# Patient Record
Sex: FEMALE | Race: WHITE | NOT HISPANIC OR LATINO | Employment: UNEMPLOYED | RURAL
[De-identification: names, ages, dates, MRNs, and addresses within clinical notes are randomized per-mention and may not be internally consistent; named-entity substitution may affect disease eponyms.]

---

## 2021-08-16 ENCOUNTER — HOSPITAL ENCOUNTER (EMERGENCY)
Facility: HOSPITAL | Age: 61
Discharge: HOME | End: 2021-08-16
Payer: MEDICAID

## 2021-08-16 ENCOUNTER — INFUSION (OUTPATIENT)
Dept: INFECTIOUS DISEASES | Facility: HOSPITAL | Age: 61
End: 2021-08-16
Attending: EMERGENCY MEDICINE
Payer: MEDICAID

## 2021-08-16 VITALS
TEMPERATURE: 101 F | DIASTOLIC BLOOD PRESSURE: 95 MMHG | RESPIRATION RATE: 21 BRPM | HEART RATE: 82 BPM | HEIGHT: 66 IN | SYSTOLIC BLOOD PRESSURE: 164 MMHG | WEIGHT: 214 LBS | OXYGEN SATURATION: 97 % | BODY MASS INDEX: 34.39 KG/M2

## 2021-08-16 VITALS
DIASTOLIC BLOOD PRESSURE: 77 MMHG | OXYGEN SATURATION: 97 % | SYSTOLIC BLOOD PRESSURE: 170 MMHG | RESPIRATION RATE: 20 BRPM | HEART RATE: 95 BPM

## 2021-08-16 DIAGNOSIS — R05.9 COUGH: ICD-10-CM

## 2021-08-16 DIAGNOSIS — U07.1 COVID-19: Primary | ICD-10-CM

## 2021-08-16 DIAGNOSIS — B34.2 CORONAVIRUS INFECTION: Primary | ICD-10-CM

## 2021-08-16 LAB
ANION GAP SERPL CALCULATED.3IONS-SCNC: 11 MMOL/L (ref 7–16)
BACTERIA #/AREA URNS HPF: ABNORMAL /HPF
BASOPHILS # BLD AUTO: 0.01 K/UL (ref 0–0.2)
BASOPHILS NFR BLD AUTO: 0.1 % (ref 0–1)
BILIRUB UR QL STRIP: NEGATIVE
BUN SERPL-MCNC: 22 MG/DL (ref 7–18)
BUN/CREAT SERPL: 21 (ref 6–20)
CALCIUM SERPL-MCNC: 8.5 MG/DL (ref 8.5–10.1)
CHLORIDE SERPL-SCNC: 98 MMOL/L (ref 98–107)
CLARITY UR: CLEAR
CO2 SERPL-SCNC: 30 MMOL/L (ref 21–32)
COLOR UR: YELLOW
CREAT SERPL-MCNC: 1.07 MG/DL (ref 0.55–1.02)
DIFFERENTIAL METHOD BLD: ABNORMAL
EOSINOPHIL # BLD AUTO: 0 K/UL (ref 0–0.5)
EOSINOPHIL NFR BLD AUTO: 0 % (ref 1–4)
ERYTHROCYTE [DISTWIDTH] IN BLOOD BY AUTOMATED COUNT: 16.6 % (ref 11.5–14.5)
FLUAV AG UPPER RESP QL IA.RAPID: NEGATIVE
FLUBV AG UPPER RESP QL IA.RAPID: NEGATIVE
GLUCOSE SERPL-MCNC: 120 MG/DL (ref 74–106)
GLUCOSE UR STRIP-MCNC: NEGATIVE MG/DL
HCT VFR BLD AUTO: 42.3 % (ref 38–47)
HGB BLD-MCNC: 13.7 G/DL (ref 12–16)
IMM GRANULOCYTES # BLD AUTO: 0.04 K/UL (ref 0–0.04)
IMM GRANULOCYTES NFR BLD: 0.6 % (ref 0–0.4)
KETONES UR STRIP-SCNC: NEGATIVE MG/DL
LEUKOCYTE ESTERASE UR QL STRIP: NEGATIVE
LYMPHOCYTES # BLD AUTO: 1.07 K/UL (ref 1–4.8)
LYMPHOCYTES NFR BLD AUTO: 15.1 % (ref 27–41)
MCH RBC QN AUTO: 26 PG (ref 27–31)
MCHC RBC AUTO-ENTMCNC: 32.4 G/DL (ref 32–36)
MCV RBC AUTO: 80.4 FL (ref 80–96)
MONOCYTES # BLD AUTO: 0.39 K/UL (ref 0–0.8)
MONOCYTES NFR BLD AUTO: 5.5 % (ref 2–6)
MPC BLD CALC-MCNC: 10.1 FL (ref 9.4–12.4)
MUCOUS THREADS #/AREA URNS HPF: ABNORMAL /HPF
NEUTROPHILS # BLD AUTO: 5.59 K/UL (ref 1.8–7.7)
NEUTROPHILS NFR BLD AUTO: 78.7 % (ref 53–65)
NITRITE UR QL STRIP: NEGATIVE
NRBC # BLD AUTO: 0 X10E3/UL
NRBC, AUTO (.00): 0 %
PH UR STRIP: 6 PH UNITS
PLATELET # BLD AUTO: 209 K/UL (ref 150–400)
POTASSIUM SERPL-SCNC: 4.9 MMOL/L (ref 3.5–5.1)
PROT UR QL STRIP: 30
RBC # BLD AUTO: 5.26 M/UL (ref 4.2–5.4)
RBC # UR STRIP: ABNORMAL /UL
RBC #/AREA URNS HPF: ABNORMAL /HPF
SARS-COV+SARS-COV-2 AG RESP QL IA.RAPID: POSITIVE
SODIUM SERPL-SCNC: 134 MMOL/L (ref 136–145)
SP GR UR STRIP: >=1.03
SQUAMOUS #/AREA URNS LPF: ABNORMAL /LPF
TRICHOMONAS #/AREA URNS HPF: ABNORMAL /HPF
UROBILINOGEN UR STRIP-ACNC: 0.2 MG/DL
WBC # BLD AUTO: 7.1 K/UL (ref 4.5–11)
WBC #/AREA URNS HPF: ABNORMAL /HPF
YEAST #/AREA URNS HPF: ABNORMAL /HPF

## 2021-08-16 PROCEDURE — 99284 EMERGENCY DEPT VISIT MOD MDM: CPT | Mod: 25

## 2021-08-16 PROCEDURE — 25000242 PHARM REV CODE 250 ALT 637 W/ HCPCS: Performed by: NURSE PRACTITIONER

## 2021-08-16 PROCEDURE — 99284 EMERGENCY DEPT VISIT MOD MDM: CPT | Mod: ,,, | Performed by: NURSE PRACTITIONER

## 2021-08-16 PROCEDURE — 85025 COMPLETE CBC W/AUTO DIFF WBC: CPT | Performed by: NURSE PRACTITIONER

## 2021-08-16 PROCEDURE — 81001 URINALYSIS AUTO W/SCOPE: CPT | Performed by: NURSE PRACTITIONER

## 2021-08-16 PROCEDURE — 99284 PR EMERGENCY DEPT VISIT,LEVEL IV: ICD-10-PCS | Mod: ,,, | Performed by: NURSE PRACTITIONER

## 2021-08-16 PROCEDURE — 36415 COLL VENOUS BLD VENIPUNCTURE: CPT | Performed by: NURSE PRACTITIONER

## 2021-08-16 PROCEDURE — 87428 SARSCOV & INF VIR A&B AG IA: CPT | Performed by: NURSE PRACTITIONER

## 2021-08-16 PROCEDURE — 81003 URINALYSIS AUTO W/O SCOPE: CPT | Performed by: NURSE PRACTITIONER

## 2021-08-16 PROCEDURE — 25000003 PHARM REV CODE 250: Performed by: NURSE PRACTITIONER

## 2021-08-16 PROCEDURE — 87040 BLOOD CULTURE FOR BACTERIA: CPT | Performed by: NURSE PRACTITIONER

## 2021-08-16 PROCEDURE — M0243 CASIRIVI AND IMDEVI INFUSION: HCPCS | Performed by: NURSE PRACTITIONER

## 2021-08-16 PROCEDURE — 63600175 PHARM REV CODE 636 W HCPCS: Performed by: NURSE PRACTITIONER

## 2021-08-16 PROCEDURE — 80048 BASIC METABOLIC PNL TOTAL CA: CPT | Performed by: NURSE PRACTITIONER

## 2021-08-16 RX ORDER — ONDANSETRON 4 MG/1
4 TABLET, FILM COATED ORAL EVERY 6 HOURS
Qty: 12 TABLET | Refills: 0 | Status: SHIPPED | OUTPATIENT
Start: 2021-08-16

## 2021-08-16 RX ORDER — ALBUTEROL SULFATE 90 UG/1
4 AEROSOL, METERED RESPIRATORY (INHALATION)
Status: COMPLETED | OUTPATIENT
Start: 2021-08-16 | End: 2021-08-16

## 2021-08-16 RX ORDER — ONDANSETRON 4 MG/1
4 TABLET, ORALLY DISINTEGRATING ORAL ONCE AS NEEDED
Status: ACTIVE | OUTPATIENT
Start: 2021-08-16 | End: 2033-01-12

## 2021-08-16 RX ORDER — EPINEPHRINE 0.3 MG/.3ML
0.3 INJECTION SUBCUTANEOUS
Status: ACTIVE | OUTPATIENT
Start: 2021-08-16

## 2021-08-16 RX ORDER — ACETAMINOPHEN 325 MG/1
650 TABLET ORAL ONCE AS NEEDED
Status: ACTIVE | OUTPATIENT
Start: 2021-08-16 | End: 2033-01-12

## 2021-08-16 RX ORDER — VIT C/E/ZN/COPPR/LUTEIN/ZEAXAN 250MG-90MG
2000 CAPSULE ORAL DAILY
Qty: 28 CAPSULE | Refills: 0 | Status: SHIPPED | OUTPATIENT
Start: 2021-08-16 | End: 2021-08-30

## 2021-08-16 RX ORDER — DIPHENHYDRAMINE HYDROCHLORIDE 50 MG/ML
25 INJECTION INTRAMUSCULAR; INTRAVENOUS ONCE AS NEEDED
Status: ACTIVE | OUTPATIENT
Start: 2021-08-16 | End: 2033-01-12

## 2021-08-16 RX ORDER — FAMOTIDINE 20 MG/1
20 TABLET, FILM COATED ORAL 2 TIMES DAILY
Qty: 28 TABLET | Refills: 0 | Status: SHIPPED | OUTPATIENT
Start: 2021-08-16 | End: 2021-08-30

## 2021-08-16 RX ORDER — ALBUTEROL SULFATE 90 UG/1
2 AEROSOL, METERED RESPIRATORY (INHALATION) EVERY 4 HOURS PRN
Qty: 8 G | Refills: 0 | Status: SHIPPED | OUTPATIENT
Start: 2021-08-16

## 2021-08-16 RX ORDER — ALBUTEROL SULFATE 90 UG/1
2 AEROSOL, METERED RESPIRATORY (INHALATION)
Status: ACTIVE | OUTPATIENT
Start: 2021-08-16

## 2021-08-16 RX ORDER — SODIUM CHLORIDE 9 MG/ML
INJECTION, SOLUTION INTRAVENOUS
Status: DISCONTINUED
Start: 2021-08-16 | End: 2021-08-16 | Stop reason: HOSPADM

## 2021-08-16 RX ADMIN — IBUPROFEN 600 MG: 400 TABLET ORAL at 09:08

## 2021-08-16 RX ADMIN — ALBUTEROL SULFATE 4 PUFF: 90 AEROSOL, METERED RESPIRATORY (INHALATION) at 09:08

## 2021-08-16 RX ADMIN — CASIRIVIMAB AND IMDEVIMAB 600 MG: 600; 600 INJECTION, SOLUTION, CONCENTRATE INTRAVENOUS at 12:08

## 2021-08-22 LAB
BACTERIA BLD CULT: NORMAL
BACTERIA BLD CULT: NORMAL

## 2022-03-28 ENCOUNTER — OUTSIDE PLACE OF SERVICE (OUTPATIENT)
Dept: CARDIOLOGY | Facility: CLINIC | Age: 62
End: 2022-03-28
Payer: MEDICAID

## 2022-03-28 PROCEDURE — 93010 ELECTROCARDIOGRAM REPORT: CPT | Mod: ,,, | Performed by: INTERNAL MEDICINE

## 2022-03-28 PROCEDURE — 93010 PR ELECTROCARDIOGRAM REPORT: ICD-10-PCS | Mod: ,,, | Performed by: INTERNAL MEDICINE

## 2022-03-29 ENCOUNTER — OUTSIDE PLACE OF SERVICE (OUTPATIENT)
Dept: CARDIOLOGY | Facility: CLINIC | Age: 62
End: 2022-03-29
Payer: MEDICAID

## 2022-03-29 PROCEDURE — 93306 TTE W/DOPPLER COMPLETE: CPT | Mod: 26,,, | Performed by: INTERNAL MEDICINE

## 2022-03-29 PROCEDURE — 93306 PR ECHO HEART XTHORACIC,COMPLETE W DOPPLER: ICD-10-PCS | Mod: 26,,, | Performed by: INTERNAL MEDICINE

## 2024-09-06 ENCOUNTER — HOSPITAL ENCOUNTER (EMERGENCY)
Facility: HOSPITAL | Age: 64
Discharge: HOME | End: 2024-09-06
Payer: MEDICAID

## 2024-09-06 VITALS
TEMPERATURE: 98 F | OXYGEN SATURATION: 97 % | HEIGHT: 66 IN | DIASTOLIC BLOOD PRESSURE: 93 MMHG | HEART RATE: 73 BPM | BODY MASS INDEX: 31.98 KG/M2 | RESPIRATION RATE: 18 BRPM | WEIGHT: 199 LBS | SYSTOLIC BLOOD PRESSURE: 159 MMHG

## 2024-09-06 DIAGNOSIS — R10.84 GENERALIZED ABDOMINAL PAIN: Primary | ICD-10-CM

## 2024-09-06 LAB
ALBUMIN SERPL BCP-MCNC: 3.2 G/DL (ref 3.5–5)
ALBUMIN/GLOB SERPL: 0.9 {RATIO}
ALP SERPL-CCNC: 138 U/L (ref 55–142)
ALT SERPL W P-5'-P-CCNC: 22 U/L (ref 13–56)
AMPHET UR QL SCN: NEGATIVE
ANION GAP SERPL CALCULATED.3IONS-SCNC: 8 MMOL/L (ref 7–16)
AST SERPL W P-5'-P-CCNC: 18 U/L (ref 15–37)
BACTERIA #/AREA URNS HPF: ABNORMAL /HPF
BARBITURATES UR QL SCN: NEGATIVE
BASOPHILS # BLD AUTO: 0.06 K/UL (ref 0–0.2)
BASOPHILS NFR BLD AUTO: 0.7 % (ref 0–1)
BENZODIAZ METAB UR QL SCN: NEGATIVE
BILIRUB SERPL-MCNC: 0.2 MG/DL (ref ?–1.2)
BILIRUB UR QL STRIP: NEGATIVE
BUN SERPL-MCNC: 15 MG/DL (ref 7–18)
BUN/CREAT SERPL: 18 (ref 6–20)
CALCIUM SERPL-MCNC: 8.7 MG/DL (ref 8.5–10.1)
CANNABINOIDS UR QL SCN: NEGATIVE
CHLORIDE SERPL-SCNC: 104 MMOL/L (ref 98–107)
CLARITY UR: CLEAR
CO2 SERPL-SCNC: 27 MMOL/L (ref 21–32)
COCAINE UR QL SCN: NEGATIVE
COLOR UR: COLORLESS
CREAT SERPL-MCNC: 0.85 MG/DL (ref 0.55–1.02)
DIFFERENTIAL METHOD BLD: ABNORMAL
EGFR (NO RACE VARIABLE) (RUSH/TITUS): 76 ML/MIN/1.73M2
EOSINOPHIL # BLD AUTO: 0.8 K/UL (ref 0–0.5)
EOSINOPHIL NFR BLD AUTO: 9.8 % (ref 1–4)
ERYTHROCYTE [DISTWIDTH] IN BLOOD BY AUTOMATED COUNT: 17 % (ref 11.5–14.5)
GLOBULIN SER-MCNC: 3.7 G/DL (ref 2–4)
GLUCOSE SERPL-MCNC: 107 MG/DL (ref 74–106)
GLUCOSE UR STRIP-MCNC: NORMAL MG/DL
HCT VFR BLD AUTO: 33.3 % (ref 38–47)
HGB BLD-MCNC: 10.2 G/DL (ref 12–16)
IMM GRANULOCYTES # BLD AUTO: 0.02 K/UL (ref 0–0.04)
IMM GRANULOCYTES NFR BLD: 0.2 % (ref 0–0.4)
KETONES UR STRIP-SCNC: NEGATIVE MG/DL
LEUKOCYTE ESTERASE UR QL STRIP: ABNORMAL
LIPASE SERPL-CCNC: 33 U/L (ref 16–77)
LYMPHOCYTES # BLD AUTO: 2.86 K/UL (ref 1–4.8)
LYMPHOCYTES NFR BLD AUTO: 35.1 % (ref 27–41)
MCH RBC QN AUTO: 23.3 PG (ref 27–31)
MCHC RBC AUTO-ENTMCNC: 30.6 G/DL (ref 32–36)
MCV RBC AUTO: 76.2 FL (ref 80–96)
MONOCYTES # BLD AUTO: 0.44 K/UL (ref 0–0.8)
MONOCYTES NFR BLD AUTO: 5.4 % (ref 2–6)
MPC BLD CALC-MCNC: 10.9 FL (ref 9.4–12.4)
NEUTROPHILS # BLD AUTO: 3.97 K/UL (ref 1.8–7.7)
NEUTROPHILS NFR BLD AUTO: 48.8 % (ref 53–65)
NITRITE UR QL STRIP: NEGATIVE
NRBC # BLD AUTO: 0 X10E3/UL
NRBC, AUTO (.00): 0 %
OPIATES UR QL SCN: NEGATIVE
PCP UR QL SCN: NEGATIVE
PH UR STRIP: 5.5 PH UNITS
PLATELET # BLD AUTO: 294 K/UL (ref 150–400)
POTASSIUM SERPL-SCNC: 4.4 MMOL/L (ref 3.5–5.1)
PROT SERPL-MCNC: 6.9 G/DL (ref 6.4–8.2)
PROT UR QL STRIP: NEGATIVE
RBC # BLD AUTO: 4.37 M/UL (ref 4.2–5.4)
RBC # UR STRIP: NEGATIVE /UL
RBC #/AREA URNS HPF: <1 /HPF
SODIUM SERPL-SCNC: 135 MMOL/L (ref 136–145)
SP GR UR STRIP: 1.01
SQUAMOUS #/AREA URNS LPF: ABNORMAL /HPF
UROBILINOGEN UR STRIP-ACNC: NORMAL MG/DL
WBC # BLD AUTO: 8.15 K/UL (ref 4.5–11)
WBC #/AREA URNS HPF: 9 /HPF

## 2024-09-06 PROCEDURE — 80307 DRUG TEST PRSMV CHEM ANLYZR: CPT | Performed by: NURSE PRACTITIONER

## 2024-09-06 PROCEDURE — 85025 COMPLETE CBC W/AUTO DIFF WBC: CPT | Performed by: NURSE PRACTITIONER

## 2024-09-06 PROCEDURE — 63600175 PHARM REV CODE 636 W HCPCS: Performed by: NURSE PRACTITIONER

## 2024-09-06 PROCEDURE — 83690 ASSAY OF LIPASE: CPT | Performed by: NURSE PRACTITIONER

## 2024-09-06 PROCEDURE — 36415 COLL VENOUS BLD VENIPUNCTURE: CPT | Performed by: NURSE PRACTITIONER

## 2024-09-06 PROCEDURE — 80053 COMPREHEN METABOLIC PANEL: CPT | Performed by: NURSE PRACTITIONER

## 2024-09-06 PROCEDURE — 99284 EMERGENCY DEPT VISIT MOD MDM: CPT | Mod: 25

## 2024-09-06 PROCEDURE — 81001 URINALYSIS AUTO W/SCOPE: CPT | Mod: XB | Performed by: NURSE PRACTITIONER

## 2024-09-06 PROCEDURE — 25000003 PHARM REV CODE 250: Performed by: NURSE PRACTITIONER

## 2024-09-06 PROCEDURE — 96372 THER/PROPH/DIAG INJ SC/IM: CPT | Performed by: NURSE PRACTITIONER

## 2024-09-06 PROCEDURE — 81003 URINALYSIS AUTO W/O SCOPE: CPT | Performed by: NURSE PRACTITIONER

## 2024-09-06 RX ORDER — ONDANSETRON 4 MG/1
4 TABLET, ORALLY DISINTEGRATING ORAL
Status: COMPLETED | OUTPATIENT
Start: 2024-09-06 | End: 2024-09-06

## 2024-09-06 RX ORDER — DICYCLOMINE HYDROCHLORIDE 10 MG/ML
20 INJECTION INTRAMUSCULAR
Status: COMPLETED | OUTPATIENT
Start: 2024-09-06 | End: 2024-09-06

## 2024-09-06 RX ADMIN — DICYCLOMINE HYDROCHLORIDE 20 MG: 10 INJECTION INTRAMUSCULAR at 10:09

## 2024-09-06 RX ADMIN — ONDANSETRON 4 MG: 4 TABLET, ORALLY DISINTEGRATING ORAL at 10:09

## 2024-09-07 NOTE — ED PROVIDER NOTES
"Encounter Date: 9/6/2024       History     Chief Complaint   Patient presents with    Abdominal Pain     Pt presents to ED via POV with c/o abdominal pain.      64-year-old female presents to ED with complaint of abdominal pain, back pain, lower extremity pain.  Patient states symptoms started 2-3 days ago with feeling that something is "crawling through her skin".  Denies fever, chills, nausea/vomiting, diarrhea.  Pertinent medical history of depression and hypertension.  Patient states that she is currently homeless.    The history is provided by the patient. No  was used.     Review of patient's allergies indicates:  No Known Allergies  Past Medical History:   Diagnosis Date    Depression     Hypertension      History reviewed. No pertinent surgical history.  No family history on file.  Social History     Tobacco Use    Smoking status: Never    Smokeless tobacco: Never   Substance Use Topics    Alcohol use: Never    Drug use: Never     Review of Systems   Constitutional:  Negative for chills and fever.   Eyes:  Negative for photophobia and visual disturbance.   Respiratory:  Negative for cough and shortness of breath.    Cardiovascular:  Negative for chest pain and palpitations.   Gastrointestinal:  Positive for abdominal pain. Negative for diarrhea and vomiting.   Musculoskeletal:  Positive for myalgias. Negative for arthralgias and gait problem.   Skin:  Negative for color change and wound.   Neurological:  Negative for dizziness and weakness.   Hematological:  Negative for adenopathy. Does not bruise/bleed easily.   Psychiatric/Behavioral:  Negative for agitation and confusion.    All other systems reviewed and are negative.      Physical Exam     Initial Vitals [09/06/24 2019]   BP Pulse Resp Temp SpO2   (!) 168/101 75 20 97.9 °F (36.6 °C) 98 %      MAP       --         Physical Exam    Nursing note and vitals reviewed.  Constitutional: She appears well-developed and well-nourished. "   HENT:   Head: Normocephalic and atraumatic.   Eyes: EOM are normal. Pupils are equal, round, and reactive to light.   Neck: Neck supple.   Normal range of motion.  Cardiovascular:  Normal rate and regular rhythm.           No murmur heard.  Pulmonary/Chest: She has no wheezes. She has no rhonchi.   Abdominal: Abdomen is soft. She exhibits no distension. There is no abdominal tenderness.   Musculoskeletal:         General: No tenderness or edema.      Cervical back: Normal range of motion and neck supple.     Lymphadenopathy:     She has no cervical adenopathy.   Neurological: She is alert and oriented to person, place, and time. No cranial nerve deficit or sensory deficit.   Skin: Skin is warm and dry. Capillary refill takes less than 2 seconds.   Psychiatric: She has a normal mood and affect. Thought content normal.         Medical Screening Exam   See Full Note    ED Course   Procedures  Labs Reviewed   COMPREHENSIVE METABOLIC PANEL - Abnormal       Result Value    Sodium 135 (*)     Potassium 4.4      Chloride 104      CO2 27      Anion Gap 8      Glucose 107 (*)     BUN 15      Creatinine 0.85      BUN/Creatinine Ratio 18      Calcium 8.7      Total Protein 6.9      Albumin 3.2 (*)     Globulin 3.7      A/G Ratio 0.9      Bilirubin, Total 0.2      Alk Phos 138      ALT 22      AST 18      eGFR 76     URINALYSIS, REFLEX TO URINE CULTURE - Abnormal    Color, UA Colorless      Clarity, UA Clear      pH, UA 5.5      Leukocytes, UA Small (*)     Nitrites, UA Negative      Protein, UA Negative      Glucose, UA Normal      Ketones, UA Negative      Urobilinogen, UA Normal      Bilirubin, UA Negative      Blood, UA Negative      Specific Gravity, UA 1.007     CBC WITH DIFFERENTIAL - Abnormal    WBC 8.15      RBC 4.37      Hemoglobin 10.2 (*)     Hematocrit 33.3 (*)     MCV 76.2 (*)     MCH 23.3 (*)     MCHC 30.6 (*)     RDW 17.0 (*)     Platelet Count 294      MPV 10.9      Neutrophils % 48.8 (*)     Lymphocytes %  35.1      Monocytes % 5.4      Eosinophils % 9.8 (*)     Basophils % 0.7      Immature Granulocytes % 0.2      nRBC, Auto 0.0      Neutrophils, Abs 3.97      Lymphocytes, Absolute 2.86      Monocytes, Absolute 0.44      Eosinophils, Absolute 0.80 (*)     Basophils, Absolute 0.06      Immature Granulocytes, Absolute 0.02      nRBC, Absolute 0.00      Diff Type Auto     URINALYSIS, MICROSCOPIC - Abnormal    WBC, UA 9 (*)     RBC, UA <1      Bacteria, UA Occasional (*)     Squamous Epithelial Cells, UA Occasional (*)    LIPASE - Normal    Lipase 33     DRUG SCREEN, URINE (BEAKER) - Normal    Barbiturates, Urine Negative      Benzodiazepine, Urine Negative      Opiates, Urine Negative      Phencyclidine, Urine Negative      Amphetamine, Urine Negative      Cannabinoid, Urine Negative      Cocaine, Urine Negative      Narrative:     The results of screening tests should be considered presumptive. Confirmatory testing is available upon request.    Cutoff Points:  PCP:         25ng/mL  AMPH:        500ng/mL  RENATE:        200ng/mL  RUTH:        200ng/mL  THC:         50ng/mL  ANNAMARIA:         300ng/mL  OPI:         2000ng/mL   CBC W/ AUTO DIFFERENTIAL    Narrative:     The following orders were created for panel order CBC W/ AUTO DIFFERENTIAL.  Procedure                               Abnormality         Status                     ---------                               -----------         ------                     CBC with Differential[[id number]]       Abnormal            Final result                 Please view results for these tests on the individual orders.          Imaging Results              X-Ray Abdomen Flat And Erect (In process)                      Medications   dicyclomine injection 20 mg (20 mg Intramuscular Given 9/6/24 2232)   ondansetron disintegrating tablet 4 mg (4 mg Oral Given 9/6/24 2232)     Medical Decision Making  64-year-old female presents to ED with complaint of abdominal pain, back pain, lower  "extremity pain.  Patient states symptoms started 2-3 days ago with feeling that something is "crawling through her skin".  Denies fever, chills, nausea/vomiting, diarrhea.  Pertinent medical history of depression and hypertension.  Patient states that she is currently homeless.    Labs, diagnostics obtained and reviewed. IM Bentyl, PO Zofran administered.     Amount and/or Complexity of Data Reviewed  External Data Reviewed: radiology and notes.     Details: [facility] visit on 8/15. CT AP reviewed.   Labs: ordered.  Radiology: ordered.     Details: No acute processes    Risk  Prescription drug management.                                      Clinical Impression:   Final diagnoses:  [R10.84] Generalized abdominal pain (Primary)        ED Disposition Condition    Discharge Stable          ED Prescriptions    None       Follow-up Information    None          Radha Anders, P  09/06/24 2332    "

## 2024-11-26 ENCOUNTER — HOSPITAL ENCOUNTER (EMERGENCY)
Facility: HOSPITAL | Age: 64
Discharge: HOME | End: 2024-11-26
Attending: EMERGENCY MEDICINE
Payer: MEDICARE

## 2024-11-26 VITALS
BODY MASS INDEX: 30.86 KG/M2 | HEIGHT: 66 IN | RESPIRATION RATE: 18 BRPM | TEMPERATURE: 98 F | OXYGEN SATURATION: 97 % | WEIGHT: 192 LBS | SYSTOLIC BLOOD PRESSURE: 128 MMHG | HEART RATE: 87 BPM | DIASTOLIC BLOOD PRESSURE: 78 MMHG

## 2024-11-26 DIAGNOSIS — R05.9 COUGH: ICD-10-CM

## 2024-11-26 DIAGNOSIS — N39.0 URINARY TRACT INFECTION WITHOUT HEMATURIA, SITE UNSPECIFIED: ICD-10-CM

## 2024-11-26 DIAGNOSIS — J06.9 UPPER RESPIRATORY TRACT INFECTION, UNSPECIFIED TYPE: Primary | ICD-10-CM

## 2024-11-26 DIAGNOSIS — R07.9 CHEST PAIN: ICD-10-CM

## 2024-11-26 LAB
BILIRUB UR QL STRIP: NEGATIVE
CLARITY UR: CLEAR
COLOR UR: ABNORMAL
GLUCOSE UR STRIP-MCNC: NORMAL MG/DL
INFLUENZA A MOLECULAR (OHS): NEGATIVE
INFLUENZA B MOLECULAR (OHS): NEGATIVE
KETONES UR STRIP-SCNC: NEGATIVE MG/DL
LEUKOCYTE ESTERASE UR QL STRIP: NEGATIVE
NITRITE UR QL STRIP: POSITIVE
PH UR STRIP: 5.5 PH UNITS
PROT UR QL STRIP: NEGATIVE
RBC # UR STRIP: NEGATIVE /UL
SARS-COV-2 RDRP RESP QL NAA+PROBE: NEGATIVE
SP GR UR STRIP: 1.02
UROBILINOGEN UR STRIP-ACNC: NORMAL MG/DL

## 2024-11-26 PROCEDURE — 99284 EMERGENCY DEPT VISIT MOD MDM: CPT | Mod: 25

## 2024-11-26 PROCEDURE — 87502 INFLUENZA DNA AMP PROBE: CPT | Performed by: EMERGENCY MEDICINE

## 2024-11-26 PROCEDURE — 25000242 PHARM REV CODE 250 ALT 637 W/ HCPCS: Performed by: EMERGENCY MEDICINE

## 2024-11-26 PROCEDURE — 87635 SARS-COV-2 COVID-19 AMP PRB: CPT | Performed by: EMERGENCY MEDICINE

## 2024-11-26 PROCEDURE — 63600175 PHARM REV CODE 636 W HCPCS: Performed by: EMERGENCY MEDICINE

## 2024-11-26 PROCEDURE — 93005 ELECTROCARDIOGRAM TRACING: CPT

## 2024-11-26 PROCEDURE — 96372 THER/PROPH/DIAG INJ SC/IM: CPT | Performed by: EMERGENCY MEDICINE

## 2024-11-26 PROCEDURE — 87086 URINE CULTURE/COLONY COUNT: CPT | Performed by: EMERGENCY MEDICINE

## 2024-11-26 PROCEDURE — 81003 URINALYSIS AUTO W/O SCOPE: CPT | Performed by: EMERGENCY MEDICINE

## 2024-11-26 RX ORDER — CEFTRIAXONE 1 G/1
1 INJECTION, POWDER, FOR SOLUTION INTRAMUSCULAR; INTRAVENOUS
Status: COMPLETED | OUTPATIENT
Start: 2024-11-26 | End: 2024-11-26

## 2024-11-26 RX ORDER — CIPROFLOXACIN 500 MG/1
500 TABLET ORAL 2 TIMES DAILY
Qty: 20 TABLET | Refills: 0 | Status: SHIPPED | OUTPATIENT
Start: 2024-11-26 | End: 2024-12-06

## 2024-11-26 RX ORDER — ALBUTEROL SULFATE 90 UG/1
2 INHALANT RESPIRATORY (INHALATION)
Status: COMPLETED | OUTPATIENT
Start: 2024-11-26 | End: 2024-11-26

## 2024-11-26 RX ADMIN — CEFTRIAXONE SODIUM 1 G: 1 INJECTION, POWDER, FOR SOLUTION INTRAMUSCULAR; INTRAVENOUS at 05:11

## 2024-11-26 RX ADMIN — ALBUTEROL SULFATE 2 PUFF: 108 INHALANT RESPIRATORY (INHALATION) at 04:11

## 2024-11-26 NOTE — ED PROVIDER NOTES
Encounter Date: 11/26/2024       History     Chief Complaint   Patient presents with    Cough     Pov to ER for coughing. Onset yesterday night    Nasal Congestion    Pleurisy     Patient complains of coughing congestion nasal drainage for the past 2 days.  Was associated with a few episodes of loose stools but no associated vomiting.  No associated fever.  Patient does still smoke.  No associated abdominal pain.  Cough is nonproductive      Review of patient's allergies indicates:  No Known Allergies  Past Medical History:   Diagnosis Date    Depression     Hypertension      History reviewed. No pertinent surgical history.  No family history on file.  Social History     Tobacco Use    Smoking status: Never    Smokeless tobacco: Never   Substance Use Topics    Alcohol use: Never    Drug use: Never     Review of Systems   Constitutional:  Negative for fever.   HENT:  Positive for congestion. Negative for sore throat.    Respiratory:  Positive for cough. Negative for shortness of breath.    Cardiovascular:  Negative for chest pain.   Gastrointestinal:  Negative for nausea.   Genitourinary:  Negative for dysuria.   Musculoskeletal:  Negative for back pain.   Skin:  Negative for rash.   Neurological:  Negative for weakness.   Hematological:  Does not bruise/bleed easily.       Physical Exam     Initial Vitals [11/26/24 0248]   BP Pulse Resp Temp SpO2   133/83 108 (!) 25 98 °F (36.7 °C) 96 %      MAP       --         Physical Exam    Nursing note and vitals reviewed.  Constitutional: She appears well-developed and well-nourished.   HENT:   Head: Normocephalic and atraumatic.   Eyes: EOM are normal. Pupils are equal, round, and reactive to light.   Neck: Neck supple. No thyromegaly present.   Normal range of motion.  Cardiovascular:  Normal rate, regular rhythm, normal heart sounds and intact distal pulses.           No murmur heard.  Pulmonary/Chest: Breath sounds normal. No respiratory distress. She has no wheezes.    Abdominal: Abdomen is soft. Bowel sounds are normal. She exhibits no distension. There is no abdominal tenderness.   Musculoskeletal:         General: No tenderness or edema. Normal range of motion.      Cervical back: Normal range of motion and neck supple.     Lymphadenopathy:     She has no cervical adenopathy.   Neurological: She is alert and oriented to person, place, and time. She has normal strength. No cranial nerve deficit or sensory deficit.   Skin: Skin is warm and dry. No rash noted.   Psychiatric: She has a normal mood and affect.         Medical Screening Exam   See Full Note    ED Course   Procedures  Labs Reviewed   URINALYSIS, REFLEX TO URINE CULTURE - Abnormal       Result Value    Color, UA Light-Yellow      Clarity, UA Clear      pH, UA 5.5      Leukocytes, UA Negative      Nitrites, UA Positive (*)     Protein, UA Negative      Glucose, UA Normal      Ketones, UA Negative      Urobilinogen, UA Normal      Bilirubin, UA Negative      Blood, UA Negative      Specific Gravity, UA 1.022     URINALYSIS, MICROSCOPIC - Abnormal    WBC, UA 3      RBC, UA 4 (*)     Bacteria, UA MOD (*)     Squamous Epithelial Cells, UA Occasional (*)     Calcium Oxalate Crystals, UA Occ (*)     Mucous Occasional (*)    INFLUENZA A & B BY MOLECULAR - Normal    INFLUENZA A MOLECULAR Negative      INFLUENZA B MOLECULAR  Negative     SARS-COV-2 RNA AMPLIFICATION, QUAL - Normal    SARS COV-2 Molecular Negative      Narrative:     Negative SARS-CoV results should not be used as the sole basis for treatment or patient management decisions; negative results should be considered in the context of a patient's recent exposures, history and the presene of clinical signs and symptoms consistent with COVID-19.  Negative results should be treated as presumptive and confirmed by molecular assay, if necessary for patient management.          Imaging Results              X-Ray Chest PA And Lateral (In process)                       Medications   albuterol inhaler 2 puff (2 puffs Inhalation Given 11/26/24 0418)     Medical Decision Making  Chest x-ray negative.    Flu and COVID negative    Given albuterol in the ER with improvement    As per after visit summary instructions  Use albuterol every 4 hours as needed    Use prescription ciprofloxacin    Use over-the-counter Robitussin.  Return the ER if symptoms worsen or new symptoms develop    Smoking cessation    Follow up with the primary care    Return the ER if symptoms worsen or new symptoms develop    Amount and/or Complexity of Data Reviewed  Radiology: ordered.    Risk  Prescription drug management.               ED Course as of 11/26/24 0521   Tue Nov 26, 2024   0515 Chest x-ray shows no acute abnormality [PK]      ED Course User Index  [PK] Forrest Kong MD                           Clinical Impression:   Final diagnoses:  [R07.9] Chest pain  [R05.9] Cough  [J06.9] Upper respiratory tract infection, unspecified type (Primary)  [N39.0] Urinary tract infection without hematuria, site unspecified               Forrest Kong MD  11/26/24 0521

## 2024-11-26 NOTE — DISCHARGE INSTRUCTIONS
Use albuterol every 4 hours as needed    Use prescription ciprofloxacin    Use over-the-counter Robitussin.  Return the ER if symptoms worsen or new symptoms develop    Smoking cessation    Follow up with the primary care    Return the ER if symptoms worsen or new symptoms develop

## 2024-11-27 LAB
BACTERIA #/AREA URNS HPF: ABNORMAL /HPF
CAOX CRY UR QL COMP ASSIST: ABNORMAL
MUCOUS, UA: ABNORMAL /LPF
RBC #/AREA URNS HPF: 4 /HPF
SQUAMOUS #/AREA URNS LPF: ABNORMAL /HPF
WBC #/AREA URNS HPF: 3 /HPF

## 2024-11-29 LAB — UA COMPLETE W REFLEX CULTURE PNL UR: ABNORMAL

## 2024-12-22 ENCOUNTER — HOSPITAL ENCOUNTER (EMERGENCY)
Facility: HOSPITAL | Age: 64
Discharge: HOME | End: 2024-12-22
Payer: MEDICARE

## 2024-12-22 VITALS
SYSTOLIC BLOOD PRESSURE: 145 MMHG | HEIGHT: 65 IN | RESPIRATION RATE: 22 BRPM | HEART RATE: 74 BPM | DIASTOLIC BLOOD PRESSURE: 86 MMHG | OXYGEN SATURATION: 98 % | TEMPERATURE: 98 F | BODY MASS INDEX: 32.32 KG/M2 | WEIGHT: 194 LBS

## 2024-12-22 DIAGNOSIS — D64.9 ANEMIA, UNSPECIFIED TYPE: ICD-10-CM

## 2024-12-22 DIAGNOSIS — N30.01 ACUTE CYSTITIS WITH HEMATURIA: Primary | ICD-10-CM

## 2024-12-22 DIAGNOSIS — B37.9 YEAST INFECTION: ICD-10-CM

## 2024-12-22 LAB
ALBUMIN SERPL BCP-MCNC: 3.4 G/DL (ref 3.4–4.8)
ALBUMIN/GLOB SERPL: 0.9 {RATIO}
ALP SERPL-CCNC: 131 U/L (ref 40–150)
ALT SERPL W P-5'-P-CCNC: 14 U/L
ANION GAP SERPL CALCULATED.3IONS-SCNC: 13 MMOL/L (ref 7–16)
ANISOCYTOSIS BLD QL SMEAR: ABNORMAL
AST SERPL W P-5'-P-CCNC: 30 U/L (ref 5–34)
BACTERIA #/AREA URNS HPF: ABNORMAL /HPF
BASOPHILS # BLD AUTO: 0.06 K/UL (ref 0–0.2)
BASOPHILS NFR BLD AUTO: 0.9 % (ref 0–1)
BILIRUB SERPL-MCNC: 0.5 MG/DL
BILIRUB UR QL STRIP: NEGATIVE
BUN SERPL-MCNC: 14 MG/DL (ref 10–20)
BUN/CREAT SERPL: 19 (ref 6–20)
CALCIUM SERPL-MCNC: 8.8 MG/DL (ref 8.4–10.2)
CHLORIDE SERPL-SCNC: 107 MMOL/L (ref 98–107)
CLARITY UR: CLEAR
CO2 SERPL-SCNC: 21 MMOL/L (ref 23–31)
COLOR UR: YELLOW
CREAT SERPL-MCNC: 0.72 MG/DL (ref 0.55–1.02)
DIFFERENTIAL METHOD BLD: ABNORMAL
EGFR (NO RACE VARIABLE) (RUSH/TITUS): 93 ML/MIN/1.73M2
EOSINOPHIL # BLD AUTO: 0.16 K/UL (ref 0–0.5)
EOSINOPHIL NFR BLD AUTO: 2.4 % (ref 1–4)
ERYTHROCYTE [DISTWIDTH] IN BLOOD BY AUTOMATED COUNT: 17.3 % (ref 11.5–14.5)
GLOBULIN SER-MCNC: 4 G/DL (ref 2–4)
GLUCOSE SERPL-MCNC: 95 MG/DL (ref 82–115)
GLUCOSE UR STRIP-MCNC: NORMAL MG/DL
HCT VFR BLD AUTO: 30.5 % (ref 38–47)
HGB BLD-MCNC: 8.9 G/DL (ref 12–16)
HYALINE CASTS #/AREA URNS LPF: ABNORMAL /LPF
HYPOCHROMIA BLD QL SMEAR: ABNORMAL
IMM GRANULOCYTES # BLD AUTO: 0.02 K/UL (ref 0–0.04)
IMM GRANULOCYTES NFR BLD: 0.3 % (ref 0–0.4)
KETONES UR STRIP-SCNC: 40 MG/DL
LEUKOCYTE ESTERASE UR QL STRIP: NEGATIVE
LYMPHOCYTES # BLD AUTO: 2.09 K/UL (ref 1–4.8)
LYMPHOCYTES NFR BLD AUTO: 31.1 % (ref 27–41)
MCH RBC QN AUTO: 21.3 PG (ref 27–31)
MCHC RBC AUTO-ENTMCNC: 29.2 G/DL (ref 32–36)
MCV RBC AUTO: 73 FL (ref 80–96)
MICROCYTES BLD QL SMEAR: ABNORMAL
MONOCYTES # BLD AUTO: 0.52 K/UL (ref 0–0.8)
MONOCYTES NFR BLD AUTO: 7.7 % (ref 2–6)
MPC BLD CALC-MCNC: 10.4 FL (ref 9.4–12.4)
MUCOUS, UA: ABNORMAL /LPF
NEUTROPHILS # BLD AUTO: 3.86 K/UL (ref 1.8–7.7)
NEUTROPHILS NFR BLD AUTO: 57.6 % (ref 53–65)
NITRITE UR QL STRIP: NEGATIVE
NRBC # BLD AUTO: 0 X10E3/UL
NRBC, AUTO (.00): 0 %
OVALOCYTES BLD QL SMEAR: ABNORMAL
PH UR STRIP: 5.5 PH UNITS
PLATELET # BLD AUTO: 429 K/UL (ref 150–400)
PLATELET MORPHOLOGY: ABNORMAL
POLYCHROMASIA BLD QL SMEAR: ABNORMAL
POTASSIUM SERPL-SCNC: 3.9 MMOL/L (ref 3.5–5.1)
PROT SERPL-MCNC: 7.4 G/DL (ref 5.8–7.6)
PROT UR QL STRIP: 10
RBC # BLD AUTO: 4.18 M/UL (ref 4.2–5.4)
RBC # UR STRIP: ABNORMAL /UL
RBC #/AREA URNS HPF: 4 /HPF
SODIUM SERPL-SCNC: 137 MMOL/L (ref 136–145)
SP GR UR STRIP: 1.03
SQUAMOUS #/AREA URNS LPF: ABNORMAL /HPF
TARGETS BLD QL SMEAR: ABNORMAL
UROBILINOGEN UR STRIP-ACNC: NORMAL MG/DL
WBC # BLD AUTO: 6.71 K/UL (ref 4.5–11)
WBC #/AREA URNS HPF: 5 /HPF
YEAST #/AREA URNS HPF: ABNORMAL /HPF

## 2024-12-22 PROCEDURE — 36415 COLL VENOUS BLD VENIPUNCTURE: CPT | Performed by: NURSE PRACTITIONER

## 2024-12-22 PROCEDURE — 85025 COMPLETE CBC W/AUTO DIFF WBC: CPT | Performed by: NURSE PRACTITIONER

## 2024-12-22 PROCEDURE — 99284 EMERGENCY DEPT VISIT MOD MDM: CPT

## 2024-12-22 PROCEDURE — 80053 COMPREHEN METABOLIC PANEL: CPT | Performed by: NURSE PRACTITIONER

## 2024-12-22 PROCEDURE — 81003 URINALYSIS AUTO W/O SCOPE: CPT | Performed by: NURSE PRACTITIONER

## 2024-12-22 RX ORDER — CEPHALEXIN 500 MG/1
500 CAPSULE ORAL 4 TIMES DAILY
Qty: 20 CAPSULE | Refills: 0 | Status: SHIPPED | OUTPATIENT
Start: 2024-12-22 | End: 2024-12-27

## 2024-12-22 RX ORDER — FLUCONAZOLE 150 MG/1
150 TABLET ORAL DAILY
Qty: 1 TABLET | Refills: 0 | Status: SHIPPED | OUTPATIENT
Start: 2024-12-22 | End: 2024-12-23

## 2024-12-22 NOTE — ED PROVIDER NOTES
"Encounter Date: 12/22/2024       History     Chief Complaint   Patient presents with    Abdominal Pain     Pt reports burning sensation in abdomen and legs after sleeping outside last night. Patient reports not being able to get warm enough and now feeling like she's "burning on the inside and on her skin"     Patient is a 64-year-old white female who presents with dysuria and burning in her lower abdomen.  She denies vomiting, diarrhea, constipation, blood in stool, or fever. She notes she is homeless and noticed symptoms this morning when she woke up.      Review of patient's allergies indicates:  No Known Allergies  Past Medical History:   Diagnosis Date    Depression     Hypertension      History reviewed. No pertinent surgical history.  No family history on file.  Social History     Tobacco Use    Smoking status: Never    Smokeless tobacco: Never   Substance Use Topics    Alcohol use: Never    Drug use: Never     Review of Systems   Constitutional:  Negative for chills and fever.   Gastrointestinal:  Positive for abdominal pain. Negative for abdominal distention, anal bleeding, blood in stool, constipation, diarrhea, nausea, rectal pain and vomiting.   Genitourinary:  Positive for dysuria.   All other systems reviewed and are negative.      Physical Exam     Initial Vitals [12/22/24 0959]   BP Pulse Resp Temp SpO2   (!) 145/86 74 (!) 22 97.9 °F (36.6 °C) 98 %      MAP       --         Physical Exam    Constitutional: She appears well-developed and well-nourished. She is not diaphoretic. No distress.   HENT:   Head: Normocephalic and atraumatic.   Eyes: Pupils are equal, round, and reactive to light.   Neck: Neck supple.   Cardiovascular:  Normal rate.           Pulmonary/Chest: Breath sounds normal. No respiratory distress.   Abdominal: Abdomen is soft. She exhibits no distension. There is no abdominal tenderness. There is no rebound and no guarding.   Musculoskeletal:      Cervical back: Neck supple. "     Neurological: She is alert and oriented to person, place, and time. GCS score is 15. GCS eye subscore is 4. GCS verbal subscore is 5. GCS motor subscore is 6.   Skin: Skin is warm and dry.   Psychiatric: She has a normal mood and affect. Her behavior is normal. Judgment and thought content normal.         Medical Screening Exam   See Full Note    ED Course   Procedures  Labs Reviewed   COMPREHENSIVE METABOLIC PANEL - Abnormal       Result Value    Sodium 137      Potassium 3.9      Chloride 107      CO2 21 (*)     Anion Gap 13      Glucose 95      BUN 14      Creatinine 0.72      BUN/Creatinine Ratio 19      Calcium 8.8      Total Protein 7.4      Albumin 3.4      Globulin 4.0      A/G Ratio 0.9      Bilirubin, Total 0.5      Alk Phos 131      ALT 14      AST 30      eGFR 93     URINALYSIS, REFLEX TO URINE CULTURE - Abnormal    Color, UA Yellow      Clarity, UA Clear      pH, UA 5.5      Leukocytes, UA Negative      Nitrites, UA Negative      Protein, UA 10 (*)     Glucose, UA Normal      Ketones, UA 40 (*)     Urobilinogen, UA Normal      Bilirubin, UA Negative      Blood, UA Trace (*)     Specific Gravity, UA 1.028     CBC WITH DIFFERENTIAL - Abnormal    WBC 6.71      RBC 4.18 (*)     Hemoglobin 8.9 (*)     Hematocrit 30.5 (*)     MCV 73.0 (*)     MCH 21.3 (*)     MCHC 29.2 (*)     RDW 17.3 (*)     Platelet Count 429 (*)     MPV 10.4      Neutrophils % 57.6      Lymphocytes % 31.1      Monocytes % 7.7 (*)     Eosinophils % 2.4      Basophils % 0.9      Immature Granulocytes % 0.3      nRBC, Auto 0.0      Neutrophils, Abs 3.86      Lymphocytes, Absolute 2.09      Monocytes, Absolute 0.52      Eosinophils, Absolute 0.16      Basophils, Absolute 0.06      Immature Granulocytes, Absolute 0.02      nRBC, Absolute 0.00      Diff Type Scan Smear     URINALYSIS, MICROSCOPIC - Abnormal    WBC, UA 5      RBC, UA 4 (*)     Bacteria, UA Few (*)     Squamous Epithelial Cells, UA Occasional (*)     Hyaline Casts, UA 0-2 (*)      Yeast, UA Occasional (*)     Mucous Occasional (*)    CBC MORPHOLOGY - Abnormal    Platelet Morphology Increased (*)     Anisocytosis 1+      Microcytosis 1+      Target Cells Few      Ovalocytes Few      Polychromasia Few      Hypochromic Few     CBC W/ AUTO DIFFERENTIAL    Narrative:     The following orders were created for panel order CBC auto differential.  Procedure                               Abnormality         Status                     ---------                               -----------         ------                     CBC with Differential[[id number]]       Abnormal            Final result                 Please view results for these tests on the individual orders.          Imaging Results    None          Medications - No data to display  Medical Decision Making  64-year-old female, homeless, slept outside last night states she woke up with burning in her abdomen.  We will check some labs and urine.    Will treat for UTI and yeast infection. Will try to give low cost abx due to homelessness. Pt updated on test results and plan.    H&H noted today 8&30. H&H 10&33 three months ago. Pt instructed to f/u with PCP for workup for anemia. She is aware of her prior dx of anemia. Denies bleeding.     Amount and/or Complexity of Data Reviewed  Labs: ordered.    Risk  Prescription drug management.                                      Clinical Impression:   Final diagnoses:  [N30.01] Acute cystitis with hematuria (Primary)  [B37.9] Yeast infection  [D64.9] Anemia, unspecified type        ED Disposition Condition    Discharge Stable          ED Prescriptions       Medication Sig Dispense Start Date End Date Auth. Provider    cephALEXin (KEFLEX) 500 MG capsule Take 1 capsule (500 mg total) by mouth 4 (four) times daily. for 5 days 20 capsule 12/22/2024 12/27/2024 Cat Melgar FNP    fluconazole (DIFLUCAN) 150 MG Tab Take 1 tablet (150 mg total) by mouth once daily. for 1 day 1 tablet 12/22/2024 12/23/2024  Cat Melgar, LESLEE          Follow-up Information       Follow up With Specialties Details Why Contact Info    primary care provider  Call in 2 days      [facility] Emergency Department Emergency Medicine  As needed, If symptoms worsen [address] [place] [id number] [phone]             Cat Melgar, LESLEE  12/22/24 1227       Cat Melgar, LESLEE  12/22/24 1229       Cat Melgar, LESLEE  12/22/24 4336

## 2024-12-22 NOTE — DISCHARGE INSTRUCTIONS
Take antibiotics as directed.  Tylenol/Motrin as needed as directed for pain.  Drink plenty of fluids.  Follow up with primary care provider in 2 days if no better.  Follow up with primary care provider for anemia.  Return to ER for new or worsening symptoms.

## 2025-05-15 ENCOUNTER — HOSPITAL ENCOUNTER (INPATIENT)
Facility: HOSPITAL | Age: 65
LOS: 2 days | Discharge: LEFT BEFORE DISCHARGE | DRG: 390 | End: 2025-05-17
Attending: HOSPITALIST | Admitting: HOSPITALIST
Payer: MEDICARE

## 2025-05-15 DIAGNOSIS — K56.7 ILEUS: ICD-10-CM

## 2025-05-15 DIAGNOSIS — R19.7 DIARRHEA, UNSPECIFIED TYPE: ICD-10-CM

## 2025-05-15 DIAGNOSIS — K56.609 SMALL BOWEL OBSTRUCTION: Primary | ICD-10-CM

## 2025-05-15 DIAGNOSIS — R11.0 NAUSEA: ICD-10-CM

## 2025-05-15 DIAGNOSIS — R93.5 ABNORMAL CT OF THE ABDOMEN: ICD-10-CM

## 2025-05-15 DIAGNOSIS — D50.0 IRON DEFICIENCY ANEMIA DUE TO CHRONIC BLOOD LOSS: ICD-10-CM

## 2025-05-15 DIAGNOSIS — K56.609 SBO (SMALL BOWEL OBSTRUCTION): ICD-10-CM

## 2025-05-15 DIAGNOSIS — R07.9 CHEST PAIN: ICD-10-CM

## 2025-05-15 DIAGNOSIS — D64.9 CHRONIC ANEMIA: ICD-10-CM

## 2025-05-15 LAB
ALBUMIN SERPL BCP-MCNC: 3.5 G/DL (ref 3.4–4.8)
ALBUMIN/GLOB SERPL: 0.7 {RATIO}
ALP SERPL-CCNC: 128 U/L (ref 40–150)
ALT SERPL W P-5'-P-CCNC: 13 U/L
ANION GAP SERPL CALCULATED.3IONS-SCNC: 12 MMOL/L (ref 7–16)
ANISOCYTOSIS BLD QL SMEAR: ABNORMAL
AST SERPL W P-5'-P-CCNC: 25 U/L (ref 11–45)
BASOPHILS # BLD AUTO: 0.05 K/UL (ref 0–0.2)
BASOPHILS NFR BLD AUTO: 0.5 % (ref 0–1)
BILIRUB SERPL-MCNC: 0.4 MG/DL
BILIRUB UR QL STRIP: NEGATIVE
BUN SERPL-MCNC: 28 MG/DL (ref 10–20)
BUN/CREAT SERPL: 31 (ref 6–20)
CALCIUM SERPL-MCNC: 9 MG/DL (ref 8.4–10.2)
CEA SERPL-MCNC: 30.3 NG/ML
CHLORIDE SERPL-SCNC: 106 MMOL/L (ref 98–107)
CLARITY UR: CLEAR
CO2 SERPL-SCNC: 20 MMOL/L (ref 23–31)
COLOR UR: YELLOW
CREAT SERPL-MCNC: 0.89 MG/DL (ref 0.55–1.02)
DIFFERENTIAL METHOD BLD: ABNORMAL
EGFR (NO RACE VARIABLE) (RUSH/TITUS): 72 ML/MIN/1.73M2
EOSINOPHIL # BLD AUTO: 0.26 K/UL (ref 0–0.5)
EOSINOPHIL NFR BLD AUTO: 2.7 % (ref 1–4)
ERYTHROCYTE [DISTWIDTH] IN BLOOD BY AUTOMATED COUNT: 18.6 % (ref 11.5–14.5)
GLOBULIN SER-MCNC: 4.7 G/DL (ref 2–4)
GLUCOSE SERPL-MCNC: 109 MG/DL (ref 82–115)
GLUCOSE UR STRIP-MCNC: NORMAL MG/DL
HAV IGM SER QL: NORMAL
HBV CORE IGM SER QL: NORMAL
HBV SURFACE AG SERPL QL IA: NORMAL
HCT VFR BLD AUTO: 32.4 % (ref 38–47)
HCV AB SER QL: NORMAL
HGB BLD-MCNC: 9.5 G/DL (ref 12–16)
HYPOCHROMIA BLD QL SMEAR: ABNORMAL
IMM GRANULOCYTES # BLD AUTO: 0.01 K/UL (ref 0–0.04)
IMM GRANULOCYTES NFR BLD: 0.1 % (ref 0–0.4)
INFLUENZA A MOLECULAR (OHS): NEGATIVE
INFLUENZA B MOLECULAR (OHS): NEGATIVE
KETONES UR STRIP-SCNC: NEGATIVE MG/DL
LEUKOCYTE ESTERASE UR QL STRIP: ABNORMAL
LIPASE SERPL-CCNC: 23 U/L
LYMPHOCYTES # BLD AUTO: 1.94 K/UL (ref 1–4.8)
LYMPHOCYTES NFR BLD AUTO: 20.1 % (ref 27–41)
MCH RBC QN AUTO: 20.5 PG (ref 27–31)
MCHC RBC AUTO-ENTMCNC: 29.3 G/DL (ref 32–36)
MCV RBC AUTO: 69.8 FL (ref 80–96)
MICROCYTES BLD QL SMEAR: ABNORMAL
MONOCYTES # BLD AUTO: 0.6 K/UL (ref 0–0.8)
MONOCYTES NFR BLD AUTO: 6.2 % (ref 2–6)
MPC BLD CALC-MCNC: 9.8 FL (ref 9.4–12.4)
MUCOUS, UA: ABNORMAL /LPF
NEUTROPHILS # BLD AUTO: 6.78 K/UL (ref 1.8–7.7)
NEUTROPHILS NFR BLD AUTO: 70.4 % (ref 53–65)
NITRITE UR QL STRIP: NEGATIVE
NRBC # BLD AUTO: 0 X10E3/UL
NRBC, AUTO (.00): 0 %
OCCULT BLOOD: NEGATIVE
PH UR STRIP: 5 PH UNITS
PLATELET # BLD AUTO: 426 K/UL (ref 150–400)
PLATELET MORPHOLOGY: ABNORMAL
POLYCHROMASIA BLD QL SMEAR: ABNORMAL
POTASSIUM SERPL-SCNC: 4.6 MMOL/L (ref 3.5–5.1)
PROT SERPL-MCNC: 8.2 G/DL (ref 5.8–7.6)
PROT UR QL STRIP: NEGATIVE
RBC # BLD AUTO: 4.64 M/UL (ref 4.2–5.4)
RBC # UR STRIP: NEGATIVE /UL
RBC #/AREA URNS HPF: 4 /HPF
SARS-COV-2 RDRP RESP QL NAA+PROBE: NEGATIVE
SODIUM SERPL-SCNC: 133 MMOL/L (ref 136–145)
SP GR UR STRIP: 1.03
SQUAMOUS #/AREA URNS LPF: ABNORMAL /HPF
UROBILINOGEN UR STRIP-ACNC: NORMAL MG/DL
WBC # BLD AUTO: 9.64 K/UL (ref 4.5–11)
WBC #/AREA URNS HPF: 9 /HPF

## 2025-05-15 PROCEDURE — 80074 ACUTE HEPATITIS PANEL: CPT | Performed by: HOSPITALIST

## 2025-05-15 PROCEDURE — 96375 TX/PRO/DX INJ NEW DRUG ADDON: CPT

## 2025-05-15 PROCEDURE — 82272 OCCULT BLD FECES 1-3 TESTS: CPT | Performed by: HOSPITALIST

## 2025-05-15 PROCEDURE — 36415 COLL VENOUS BLD VENIPUNCTURE: CPT | Performed by: NURSE PRACTITIONER

## 2025-05-15 PROCEDURE — 25500020 PHARM REV CODE 255: Performed by: NURSE PRACTITIONER

## 2025-05-15 PROCEDURE — 25000003 PHARM REV CODE 250: Performed by: NURSE PRACTITIONER

## 2025-05-15 PROCEDURE — 96374 THER/PROPH/DIAG INJ IV PUSH: CPT

## 2025-05-15 PROCEDURE — 96361 HYDRATE IV INFUSION ADD-ON: CPT

## 2025-05-15 PROCEDURE — 87506 IADNA-DNA/RNA PROBE TQ 6-11: CPT | Performed by: HOSPITALIST

## 2025-05-15 PROCEDURE — 80053 COMPREHEN METABOLIC PANEL: CPT | Performed by: NURSE PRACTITIONER

## 2025-05-15 PROCEDURE — 25000003 PHARM REV CODE 250: Performed by: HOSPITALIST

## 2025-05-15 PROCEDURE — 85025 COMPLETE CBC W/AUTO DIFF WBC: CPT | Performed by: NURSE PRACTITIONER

## 2025-05-15 PROCEDURE — S5010 5% DEXTROSE AND 0.45% SALINE: HCPCS | Performed by: HOSPITALIST

## 2025-05-15 PROCEDURE — 83690 ASSAY OF LIPASE: CPT | Performed by: NURSE PRACTITIONER

## 2025-05-15 PROCEDURE — 11000001 HC ACUTE MED/SURG PRIVATE ROOM

## 2025-05-15 PROCEDURE — 63600175 PHARM REV CODE 636 W HCPCS: Performed by: STUDENT IN AN ORGANIZED HEALTH CARE EDUCATION/TRAINING PROGRAM

## 2025-05-15 PROCEDURE — 87635 SARS-COV-2 COVID-19 AMP PRB: CPT | Performed by: NURSE PRACTITIONER

## 2025-05-15 PROCEDURE — 87449 NOS EACH ORGANISM AG IA: CPT | Performed by: HOSPITALIST

## 2025-05-15 PROCEDURE — 27000207 HC ISOLATION

## 2025-05-15 PROCEDURE — 81003 URINALYSIS AUTO W/O SCOPE: CPT | Performed by: NURSE PRACTITIONER

## 2025-05-15 PROCEDURE — 36415 COLL VENOUS BLD VENIPUNCTURE: CPT | Performed by: HOSPITALIST

## 2025-05-15 PROCEDURE — 87502 INFLUENZA DNA AMP PROBE: CPT | Performed by: NURSE PRACTITIONER

## 2025-05-15 PROCEDURE — 99285 EMERGENCY DEPT VISIT HI MDM: CPT | Mod: 25

## 2025-05-15 PROCEDURE — 82378 CARCINOEMBRYONIC ANTIGEN: CPT | Performed by: HOSPITALIST

## 2025-05-15 PROCEDURE — 63600175 PHARM REV CODE 636 W HCPCS: Performed by: HOSPITALIST

## 2025-05-15 RX ORDER — PROCHLORPERAZINE EDISYLATE 5 MG/ML
2.5 INJECTION INTRAMUSCULAR; INTRAVENOUS EVERY 6 HOURS PRN
Status: DISCONTINUED | OUTPATIENT
Start: 2025-05-15 | End: 2025-05-17 | Stop reason: HOSPADM

## 2025-05-15 RX ORDER — ONDANSETRON HYDROCHLORIDE 2 MG/ML
4 INJECTION, SOLUTION INTRAVENOUS EVERY 6 HOURS PRN
Status: DISCONTINUED | OUTPATIENT
Start: 2025-05-15 | End: 2025-05-17 | Stop reason: HOSPADM

## 2025-05-15 RX ORDER — GLUCAGON 1 MG
1 KIT INJECTION
Status: DISCONTINUED | OUTPATIENT
Start: 2025-05-15 | End: 2025-05-17 | Stop reason: HOSPADM

## 2025-05-15 RX ORDER — ALBUTEROL SULFATE 90 UG/1
2 INHALANT RESPIRATORY (INHALATION) EVERY 4 HOURS PRN
Status: DISCONTINUED | OUTPATIENT
Start: 2025-05-15 | End: 2025-05-17 | Stop reason: HOSPADM

## 2025-05-15 RX ORDER — DEXTROSE MONOHYDRATE AND SODIUM CHLORIDE 5; .45 G/100ML; G/100ML
INJECTION, SOLUTION INTRAVENOUS CONTINUOUS
Status: DISCONTINUED | OUTPATIENT
Start: 2025-05-15 | End: 2025-05-17 | Stop reason: HOSPADM

## 2025-05-15 RX ORDER — SODIUM CHLORIDE 9 MG/ML
1000 INJECTION, SOLUTION INTRAVENOUS
Status: COMPLETED | OUTPATIENT
Start: 2025-05-15 | End: 2025-05-15

## 2025-05-15 RX ORDER — NALOXONE HCL 0.4 MG/ML
0.02 VIAL (ML) INJECTION
Status: DISCONTINUED | OUTPATIENT
Start: 2025-05-15 | End: 2025-05-17 | Stop reason: HOSPADM

## 2025-05-15 RX ORDER — HEPARIN SODIUM 5000 [USP'U]/ML
5000 INJECTION, SOLUTION INTRAVENOUS; SUBCUTANEOUS EVERY 8 HOURS
Status: DISCONTINUED | OUTPATIENT
Start: 2025-05-15 | End: 2025-05-17 | Stop reason: HOSPADM

## 2025-05-15 RX ORDER — SODIUM CHLORIDE 0.9 % (FLUSH) 0.9 %
10 SYRINGE (ML) INJECTION EVERY 12 HOURS PRN
Status: DISCONTINUED | OUTPATIENT
Start: 2025-05-15 | End: 2025-05-17 | Stop reason: HOSPADM

## 2025-05-15 RX ORDER — IBUPROFEN 200 MG
16 TABLET ORAL
Status: DISCONTINUED | OUTPATIENT
Start: 2025-05-15 | End: 2025-05-17 | Stop reason: HOSPADM

## 2025-05-15 RX ORDER — ONDANSETRON HYDROCHLORIDE 2 MG/ML
4 INJECTION, SOLUTION INTRAVENOUS EVERY 8 HOURS PRN
Status: DISCONTINUED | OUTPATIENT
Start: 2025-05-15 | End: 2025-05-17 | Stop reason: HOSPADM

## 2025-05-15 RX ORDER — METRONIDAZOLE 500 MG/1
500 TABLET ORAL EVERY 8 HOURS
Status: DISCONTINUED | OUTPATIENT
Start: 2025-05-15 | End: 2025-05-17 | Stop reason: HOSPADM

## 2025-05-15 RX ORDER — IBUPROFEN 200 MG
24 TABLET ORAL
Status: DISCONTINUED | OUTPATIENT
Start: 2025-05-15 | End: 2025-05-17 | Stop reason: HOSPADM

## 2025-05-15 RX ORDER — IOPAMIDOL 755 MG/ML
100 INJECTION, SOLUTION INTRAVASCULAR
Status: COMPLETED | OUTPATIENT
Start: 2025-05-15 | End: 2025-05-15

## 2025-05-15 RX ORDER — CEFTRIAXONE 1 G/1
1 INJECTION, POWDER, FOR SOLUTION INTRAMUSCULAR; INTRAVENOUS
Status: DISCONTINUED | OUTPATIENT
Start: 2025-05-15 | End: 2025-05-17 | Stop reason: HOSPADM

## 2025-05-15 RX ORDER — ACETAMINOPHEN 325 MG/1
650 TABLET ORAL EVERY 4 HOURS PRN
Status: DISCONTINUED | OUTPATIENT
Start: 2025-05-15 | End: 2025-05-17 | Stop reason: HOSPADM

## 2025-05-15 RX ADMIN — IOPAMIDOL 100 ML: 755 INJECTION, SOLUTION INTRAVENOUS at 03:05

## 2025-05-15 RX ADMIN — HEPARIN SODIUM 5000 UNITS: 5000 INJECTION, SOLUTION INTRAVENOUS; SUBCUTANEOUS at 09:05

## 2025-05-15 RX ADMIN — SODIUM CHLORIDE 1000 ML: 9 INJECTION, SOLUTION INTRAVENOUS at 01:05

## 2025-05-15 RX ADMIN — ONDANSETRON 4 MG: 2 INJECTION INTRAMUSCULAR; INTRAVENOUS at 07:05

## 2025-05-15 RX ADMIN — METRONIDAZOLE 500 MG: 500 TABLET ORAL at 09:05

## 2025-05-15 RX ADMIN — SODIUM CHLORIDE 1750 MG: 9 INJECTION, SOLUTION INTRAVENOUS at 09:05

## 2025-05-15 RX ADMIN — PROCHLORPERAZINE EDISYLATE 2.5 MG: 5 INJECTION INTRAMUSCULAR; INTRAVENOUS at 07:05

## 2025-05-15 RX ADMIN — CEFTRIAXONE SODIUM 1 G: 1 INJECTION, POWDER, FOR SOLUTION INTRAMUSCULAR; INTRAVENOUS at 08:05

## 2025-05-15 RX ADMIN — DEXTROSE AND SODIUM CHLORIDE: 5; 450 INJECTION, SOLUTION INTRAVENOUS at 08:05

## 2025-05-15 NOTE — H&P
[facility] - Emergency Department  [facility] Medicine  History & Physical    Patient Name: Heather Rockwell  MRN: [id number]  Patient Class: Emergency  Admission Date: 5/15/2025  Attending Physician: Shantel Dawn DO   Primary Care Provider: Karlie, Primary Doctor       Subjective:     Principal Problem:Small bowel obstruction    Chief Complaint:   Chief Complaint   Patient presents with    Abdominal Pain    Diarrhea    Generalized Body Aches        HPI: 65 year old female presents with abdominal pain, nausea, and vomiting since last night.  She was also having loose BM's.  In the ER, Ct scan shows ileus/SBO.      Past Medical History:   Diagnosis Date    Depression     Hypertension        History reviewed. No pertinent surgical history.    Review of patient's allergies indicates:  No Known Allergies    No current facility-administered medications on file prior to encounter.     Current Outpatient Medications on File Prior to Encounter   Medication Sig    albuterol (PROVENTIL/VENTOLIN HFA) 90 mcg/actuation inhaler Inhale 2 puffs into the lungs every 4 (four) hours as needed for Wheezing. Rescue    famotidine (PEPCID) 20 MG tablet Take 1 tablet (20 mg total) by mouth 2 (two) times daily. for 14 days    ondansetron (ZOFRAN) 4 MG tablet Take 1 tablet (4 mg total) by mouth every 6 (six) hours.     Family History    None       Tobacco Use    Smoking status: Never    Smokeless tobacco: Never   Substance and Sexual Activity    Alcohol use: Never    Drug use: Never    Sexual activity: Not Currently     Review of Systems   Gastrointestinal:  Positive for abdominal distention, abdominal pain, diarrhea, nausea and vomiting.     Objective:     Vital Signs (Most Recent):  Temp: 98.4 °F (36.9 °C) (05/15/25 1255)  Pulse: 92 (05/15/25 1255)  Resp: 17 (05/15/25 1255)  BP: 137/76 (05/15/25 1255)  SpO2: 98 % (05/15/25 1255) Vital Signs (24h Range):  Temp:  [98.4 °F (36.9 °C)] 98.4 °F (36.9 °C)  Pulse:  [92] 92  Resp:   [17] 17  SpO2:  [98 %] 98 %  BP: (137)/(76) 137/76         PHYSICAL EXAM:    GEN: NAD; alert and oriented x 3  ENT: hearing intact; no oral lesions  CVS: regular rate and rhythm; no murmurs  RESP: clear to auscultation bilaterally; no rhonchi, rales, or wheezes noted  GI: soft, mildly tender, mildly distended; + bowel sounds  EXTR: no clubbing, cyanosis, or edema  NEURO: no focal or motor deficits; CN II-XII appear to be intact  PSYCH: normal affect  SKIN: no rashes or lesions noted    Assessment/Plan:     Assessment & Plan  Small bowel obstruction  Consult Surgery; will place on CLD; CT scan showed possible ileus/SBO; will consult Surgery; she also has possible colitis; will start Vancomycin, Rocephin, and Flagyl; will check C diff and Enteric pathogen panel; CT showed possible malignancy; will check CEA; will consult GI; will start IVF; check occult blood; check hepatitis panel             Shantel Dawn DO  Department of Hospital Medicine  [facility] - Emergency Department

## 2025-05-15 NOTE — HPI
65 year old female presents with abdominal pain, nausea, and vomiting since last night.  She was also having loose BM's.  In the ER, CT scan shows ileus/SBO.

## 2025-05-15 NOTE — ED PROVIDER NOTES
Encounter Date: 5/15/2025       History     Chief Complaint   Patient presents with    Abdominal Pain    Diarrhea    Generalized Body Aches     65 year old female presents to ED with complaint of abdominal pain, body aches, and diarrhea. Patient reports symptoms started on last night. She reports multiple episodes of diarrhea. Denies known fever; reports chills. Denies chest pain, shortness of breath. Denies known sick contacts.         Review of patient's allergies indicates:  No Known Allergies  Past Medical History:   Diagnosis Date    Depression     Hypertension      History reviewed. No pertinent surgical history.  No family history on file.  Social History[1]  Review of Systems   Constitutional:  Negative for chills and fever.   Eyes:  Negative for photophobia and visual disturbance.   Respiratory:  Negative for cough and shortness of breath.    Cardiovascular:  Negative for chest pain and palpitations.   Gastrointestinal:  Positive for abdominal pain and diarrhea. Negative for nausea and vomiting.   Genitourinary:  Negative for difficulty urinating and dysuria.   Musculoskeletal:  Negative for arthralgias and gait problem.   Skin:  Negative for color change and wound.   Neurological:  Negative for dizziness and weakness.   Hematological:  Negative for adenopathy. Does not bruise/bleed easily.   Psychiatric/Behavioral:  Negative for agitation and confusion.    All other systems reviewed and are negative.      Physical Exam     Initial Vitals [05/15/25 1255]   BP Pulse Resp Temp SpO2   137/76 92 17 98.4 °F (36.9 °C) 98 %      MAP       --         Physical Exam    Nursing note and vitals reviewed.  Constitutional: She appears well-developed. She appears distressed.   Disheveled appearance   HENT:   Head: Normocephalic.   Eyes: EOM are normal. Pupils are equal, round, and reactive to light.   Neck: Neck supple.   Normal range of motion.  Cardiovascular:  Normal rate and regular rhythm.           No murmur  heard.  Pulmonary/Chest: She has no wheezes. She has no rhonchi.   Abdominal: She exhibits no distension. There is no abdominal tenderness.   Musculoskeletal:         General: No tenderness or edema.      Cervical back: Normal range of motion and neck supple.     Lymphadenopathy:     She has no cervical adenopathy.   Neurological: She is alert and oriented to person, place, and time. No cranial nerve deficit or sensory deficit.   Skin: Skin is warm and dry. Capillary refill takes less than 2 seconds.   Psychiatric: She has a normal mood and affect. Thought content normal.         Medical Screening Exam   See Full Note    ED Course   Procedures  Labs Reviewed   COMPREHENSIVE METABOLIC PANEL - Abnormal       Result Value    Sodium 133 (*)     Potassium 4.6      Chloride 106      CO2 20 (*)     Anion Gap 12      Glucose 109      BUN 28 (*)     Creatinine 0.89      BUN/Creatinine Ratio 31 (*)     Calcium 9.0      Total Protein 8.2 (*)     Albumin 3.5      Globulin 4.7 (*)     A/G Ratio 0.7      Bilirubin, Total 0.4      Alk Phos 128      ALT 13      AST 25      eGFR 72     URINALYSIS, REFLEX TO URINE CULTURE - Abnormal    Color, UA Yellow      Clarity, UA Clear      pH, UA 5.0      Leukocytes, UA Small (*)     Nitrites, UA Negative      Protein, UA Negative      Glucose, UA Normal      Ketones, UA Negative      Urobilinogen, UA Normal      Bilirubin, UA Negative      Blood, UA Negative      Specific Gravity, UA 1.031 (*)    CBC WITH DIFFERENTIAL - Abnormal    WBC 9.64      RBC 4.64      Hemoglobin 9.5 (*)     Hematocrit 32.4 (*)     MCV 69.8 (*)     MCH 20.5 (*)     MCHC 29.3 (*)     RDW 18.6 (*)     Platelet Count 426 (*)     MPV 9.8      Neutrophils % 70.4 (*)     Lymphocytes % 20.1 (*)     Monocytes % 6.2 (*)     Eosinophils % 2.7      Basophils % 0.5      Immature Granulocytes % 0.1      nRBC, Auto 0.0      Neutrophils, Abs 6.78      Lymphocytes, Absolute 1.94      Monocytes, Absolute 0.60      Eosinophils, Absolute  0.26      Basophils, Absolute 0.05      Immature Granulocytes, Absolute 0.01      nRBC, Absolute 0.00      Diff Type Scan Smear     URINALYSIS, MICROSCOPIC - Abnormal    WBC, UA 9 (*)     RBC, UA 4 (*)     Squamous Epithelial Cells, UA Occasional (*)     Mucous Occasional (*)    CBC MORPHOLOGY - Abnormal    Platelet Morphology Increased (*)     Anisocytosis Few      Microcytosis 1+      Polychromasia Few      Hypochromic Few     INFLUENZA A & B BY MOLECULAR - Normal    INFLUENZA A MOLECULAR Negative      INFLUENZA B MOLECULAR  Negative     LIPASE - Normal    Lipase 23     SARS-COV-2 RNA AMPLIFICATION, QUAL - Normal    SARS COV-2 Molecular Negative      Narrative:     Negative SARS-CoV results should not be used as the sole basis for treatment or patient management decisions; negative results should be considered in the context of a patient's recent exposures, history and the presene of clinical signs and symptoms consistent with COVID-19.  Negative results should be treated as presumptive and confirmed by molecular assay, if necessary for patient management.   CBC W/ AUTO DIFFERENTIAL    Narrative:     The following orders were created for panel order CBC W/ AUTO DIFFERENTIAL.  Procedure                               Abnormality         Status                     ---------                               -----------         ------                     CBC with Differential[[id number]]       Abnormal            Final result                 Please view results for these tests on the individual orders.          Imaging Results              CT Abdomen Pelvis With IV Contrast NO Oral Contrast (Final result)  Result time 05/15/25 15:47:19      Final result by Ginger Auguste MD (05/15/25 15:47:19)                   Impression:      1. Dilation of a short segment of small bowel in the left upper quadrant with a gradual transition to normal caliber small bowel.  The differential for this finding includes focal ileus  versus early/partial small bowel obstruction.  2. Thickening and hyperenhancement of the wall of a short segment of the transverse colon in the left upper quadrant, nonspecific.  While this may represent an infectious or inflammatory colitis, malignancy is difficult to exclude.  Colonoscopy could be considered if not otherwise recently performed.  3. Dilation of the common bile duct up to 15 mm.  If there is any clinical concern for biliary obstruction, consider MRCP/ERCP      Electronically signed by: Ginger Auguste  Date:    05/15/2025  Time:    15:47               Narrative:    EXAMINATION:  CT ABDOMEN PELVIS WITH IV CONTRAST    CLINICAL HISTORY:  Bowel obstruction suspected;    TECHNIQUE:  Low dose axial images, sagittal and coronal reformations were obtained from the lung bases to the pubic symphysis following the IV administration of 100 mL of Omnipaque 350    COMPARISON:  06/04/2019 and additional priors    FINDINGS:  Lower chest: Unremarkable.    Liver: Unremarkable.    Gallbladder and bile ducts: The gallbladder is surgically absent.  The common bile duct measures up to 15 mm (series 2, image 46).    Pancreas: Unremarkable.    Spleen: Unremarkable.    Adrenals: Unremarkable.    Kidneys: Unremarkable.    Lymph nodes: No abdominal or pelvic lymphadenopathy.    Bowel and mesentery: There is dilation of a short segment of proximal small bowel up to 3.4 cm (series 2, image 48) with a gradual transition to normal caliber bowel and no definite transition point identified.  There is focal thickening of the wall of the transverse colon (series 2, image 63).    Abdominal aorta: Unremarkable.    Inferior vena cava: Unremarkable.    Free fluid or free air: None.    Pelvis: Unremarkable.    Body wall: Unremarkable.    Bones: Unremarkable.                                       X-Ray Abdomen Flat And Erect (Final result)  Result time 05/15/25 14:12:33      Final result by Ginger Auguste MD (05/15/25 14:12:33)                    Impression:      Limited exam.  Within this limitation, there are radiographic findings suggestive of small-bowel obstruction.  Cross-sectional imaging is recommended for further evaluation.      Electronically signed by: Ginger Auguste  Date:    05/15/2025  Time:    14:12               Narrative:    EXAMINATION:  XR ABDOMEN FLAT AND ERECT    CLINICAL HISTORY:  Abdominal Pain;    TECHNIQUE:  Flat and erect AP views of the abdomen were performed.    COMPARISON:  09/06/2024    FINDINGS:  This exam is limited as the abdomen is not included in the field of view in its entirety on this exam.  Within this limitation, suggestion of dilated loops of small bowel in the upper abdomen with differential fluid levels.  A small amount of stool is present in the colon.  No abnormal intra on all calcifications.                                       Medications   0.9% NaCl infusion (1,000 mLs Intravenous New Bag 5/15/25 1354)   iopamidoL (ISOVUE-370) injection 100 mL (100 mLs Intravenous Given 5/15/25 1527)     Medical Decision Making  65 year old female presents to ED with complaint of abdominal pain, body aches, and diarrhea. Patient reports symptoms started on last night. She reports multiple episodes of diarrhea. Denies known fever; reports chills. Denies chest pain, shortness of breath. Denies known sick contacts.     Labs, diagnostics ordered and reviewed  Radiologist interpretation reviewed  IV NS administered    Case discussed with Dr. Rodriguez, Dr. Dawn for admission    Amount and/or Complexity of Data Reviewed  Labs: ordered.  Radiology: ordered.     Details:  Dilation of a short segment of small bowel in the left upper quadrant with a gradual transition to normal caliber small bowel.  The differential for this finding includes focal ileus versus early/partial small bowel obstruction.  2. Thickening and hyperenhancement of the wall of a short segment of the transverse colon in the left upper quadrant,  nonspecific.  While this may represent an infectious or inflammatory colitis, malignancy is difficult to exclude.  Colonoscopy could be considered if not otherwise recently performed.  3. Dilation of the common bile duct up to 15 mm.  If there is any clinical concern for biliary obstruction, consider MRCP/ERCP    Risk  Prescription drug management.                                      Clinical Impression:   Final diagnoses:  [K56.609] SBO (small bowel obstruction)                   [1]   Social History  Tobacco Use    Smoking status: Never    Smokeless tobacco: Never   Substance Use Topics    Alcohol use: Never    Drug use: Never        Radha Anders, [facility]  05/15/25 1825

## 2025-05-15 NOTE — SUBJECTIVE & OBJECTIVE
Past Medical History:   Diagnosis Date    Depression     Hypertension        History reviewed. No pertinent surgical history.    Review of patient's allergies indicates:  No Known Allergies    No current facility-administered medications on file prior to encounter.     Current Outpatient Medications on File Prior to Encounter   Medication Sig    albuterol (PROVENTIL/VENTOLIN HFA) 90 mcg/actuation inhaler Inhale 2 puffs into the lungs every 4 (four) hours as needed for Wheezing. Rescue    famotidine (PEPCID) 20 MG tablet Take 1 tablet (20 mg total) by mouth 2 (two) times daily. for 14 days    ondansetron (ZOFRAN) 4 MG tablet Take 1 tablet (4 mg total) by mouth every 6 (six) hours.     Family History    None       Tobacco Use    Smoking status: Never    Smokeless tobacco: Never   Substance and Sexual Activity    Alcohol use: Never    Drug use: Never    Sexual activity: Not Currently     Review of Systems   Gastrointestinal:  Positive for abdominal distention, abdominal pain, diarrhea, nausea and vomiting.     Objective:     Vital Signs (Most Recent):  Temp: 98.4 °F (36.9 °C) (05/15/25 1255)  Pulse: 92 (05/15/25 1255)  Resp: 17 (05/15/25 1255)  BP: 137/76 (05/15/25 1255)  SpO2: 98 % (05/15/25 1255) Vital Signs (24h Range):  Temp:  [98.4 °F (36.9 °C)] 98.4 °F (36.9 °C)  Pulse:  [92] 92  Resp:  [17] 17  SpO2:  [98 %] 98 %  BP: (137)/(76) 137/76         PHYSICAL EXAM:    GEN: NAD; alert and oriented x 3  ENT: hearing intact; no oral lesions  CVS: regular rate and rhythm; no murmurs  RESP: clear to auscultation bilaterally; no rhonchi, rales, or wheezes noted  GI: soft, mildly tender, mildly distended; + bowel sounds  EXTR: no clubbing, cyanosis, or edema  NEURO: no focal or motor deficits; CN II-XII appear to be intact  PSYCH: normal affect  SKIN: no rashes or lesions noted

## 2025-05-15 NOTE — ASSESSMENT & PLAN NOTE
Consult Surgery; will place on CLD; CT scan showed possible ileus/SBO; will consult Surgery; she also has possible colitis; will start Vancomycin, Rocephin, and Flagyl; will check C diff and Enteric pathogen panel; CT showed possible malignancy; will check CEA; will consult GI; will start IVF; check occult blood; check hepatitis panel

## 2025-05-16 ENCOUNTER — ANESTHESIA (OUTPATIENT)
Dept: GASTROENTEROLOGY | Facility: HOSPITAL | Age: 65
End: 2025-05-16
Payer: MEDICARE

## 2025-05-16 ENCOUNTER — ANESTHESIA EVENT (OUTPATIENT)
Dept: GASTROENTEROLOGY | Facility: HOSPITAL | Age: 65
End: 2025-05-16
Payer: MEDICARE

## 2025-05-16 PROBLEM — K56.609 SMALL BOWEL OBSTRUCTION: Status: ACTIVE | Noted: 2025-05-16

## 2025-05-16 PROBLEM — R11.0 NAUSEA: Status: RESOLVED | Noted: 2025-05-16 | Resolved: 2025-05-16

## 2025-05-16 PROBLEM — D64.9 CHRONIC ANEMIA: Status: RESOLVED | Noted: 2025-05-16 | Resolved: 2025-05-16

## 2025-05-16 PROBLEM — R93.5 ABNORMAL CT OF THE ABDOMEN: Status: RESOLVED | Noted: 2025-05-16 | Resolved: 2025-05-16

## 2025-05-16 PROBLEM — R19.7 DIARRHEA: Status: RESOLVED | Noted: 2025-05-16 | Resolved: 2025-05-16

## 2025-05-16 PROBLEM — K83.8 COMMON BILE DUCT DILATION: Status: ACTIVE | Noted: 2025-05-16

## 2025-05-16 PROBLEM — R93.5 ABNORMAL CT OF THE ABDOMEN: Status: ACTIVE | Noted: 2025-05-16

## 2025-05-16 PROBLEM — K56.7 ILEUS: Status: RESOLVED | Noted: 2025-05-15 | Resolved: 2025-05-16

## 2025-05-16 PROBLEM — R19.7 DIARRHEA: Status: ACTIVE | Noted: 2025-05-16

## 2025-05-16 PROBLEM — D64.9 CHRONIC ANEMIA: Status: ACTIVE | Noted: 2025-05-16

## 2025-05-16 PROBLEM — R11.0 NAUSEA: Status: ACTIVE | Noted: 2025-05-16

## 2025-05-16 LAB
C COLI+JEJ+UPSA DNA STL QL NAA+NON-PROBE: NEGATIVE
C DIFF TOX A+B STL QL IA: NEGATIVE
CLOSTRIDIOIDES DIFFICILE GDH ANTIGEN (OHS): NEGATIVE
E COLI SXT1 STL QL IA: NEGATIVE
E COLI SXT2 STL QL IA: NEGATIVE
FERRITIN SERPL-MCNC: 12 NG/ML (ref 5–204)
IRON SATN MFR SERPL: 5 % (ref 20–50)
IRON SERPL-MCNC: 20 UG/DL (ref 50–170)
NOROVIRUS GI+II RNA STL QL NAA+NON-PROBE: NEGATIVE
RVA RNA STL QL NAA+NON-PROBE: NEGATIVE
S ENT+BONG DNA STL QL NAA+NON-PROBE: NEGATIVE
SHIGELLA SPECIES NAT: NEGATIVE
TIBC SERPL-MCNC: 359 UG/DL (ref 70–310)
TIBC SERPL-MCNC: 379 UG/DL (ref 250–450)
TRANSFERRIN SERPL-MCNC: 356 MG/DL (ref 173–360)
V CHOL+PARA+VUL DNA STL QL NAA+NON-PROBE: NEGATIVE
Y ENTEROCOL DNA STL QL NAA+NON-PROBE: NEGATIVE

## 2025-05-16 PROCEDURE — 63600175 PHARM REV CODE 636 W HCPCS: Performed by: HOSPITALIST

## 2025-05-16 PROCEDURE — S5010 5% DEXTROSE AND 0.45% SALINE: HCPCS | Performed by: HOSPITALIST

## 2025-05-16 PROCEDURE — 88305 TISSUE EXAM BY PATHOLOGIST: CPT | Mod: 26,,, | Performed by: PATHOLOGY

## 2025-05-16 PROCEDURE — 83540 ASSAY OF IRON: CPT | Performed by: INTERNAL MEDICINE

## 2025-05-16 PROCEDURE — 27000207 HC ISOLATION

## 2025-05-16 PROCEDURE — 88305 TISSUE EXAM BY PATHOLOGIST: CPT | Mod: TC,SUR | Performed by: INTERNAL MEDICINE

## 2025-05-16 PROCEDURE — 11000001 HC ACUTE MED/SURG PRIVATE ROOM

## 2025-05-16 PROCEDURE — 43239 EGD BIOPSY SINGLE/MULTIPLE: CPT | Performed by: INTERNAL MEDICINE

## 2025-05-16 PROCEDURE — 36415 COLL VENOUS BLD VENIPUNCTURE: CPT | Performed by: INTERNAL MEDICINE

## 2025-05-16 PROCEDURE — 25000003 PHARM REV CODE 250: Performed by: HOSPITALIST

## 2025-05-16 PROCEDURE — 82728 ASSAY OF FERRITIN: CPT | Performed by: INTERNAL MEDICINE

## 2025-05-16 PROCEDURE — 63600175 PHARM REV CODE 636 W HCPCS: Performed by: NURSE ANESTHETIST, CERTIFIED REGISTERED

## 2025-05-16 PROCEDURE — 87324 CLOSTRIDIUM AG IA: CPT | Performed by: HOSPITALIST

## 2025-05-16 PROCEDURE — 99223 1ST HOSP IP/OBS HIGH 75: CPT | Mod: ,,, | Performed by: STUDENT IN AN ORGANIZED HEALTH CARE EDUCATION/TRAINING PROGRAM

## 2025-05-16 PROCEDURE — 0DB98ZX EXCISION OF DUODENUM, VIA NATURAL OR ARTIFICIAL OPENING ENDOSCOPIC, DIAGNOSTIC: ICD-10-PCS | Performed by: INTERNAL MEDICINE

## 2025-05-16 PROCEDURE — 43239 EGD BIOPSY SINGLE/MULTIPLE: CPT | Mod: ,,, | Performed by: INTERNAL MEDICINE

## 2025-05-16 RX ORDER — PROPOFOL 10 MG/ML
INJECTION, EMULSION INTRAVENOUS
Status: DISCONTINUED | OUTPATIENT
Start: 2025-05-16 | End: 2025-05-16

## 2025-05-16 RX ORDER — LIDOCAINE HYDROCHLORIDE 20 MG/ML
INJECTION, SOLUTION EPIDURAL; INFILTRATION; INTRACAUDAL; PERINEURAL
Status: DISCONTINUED | OUTPATIENT
Start: 2025-05-16 | End: 2025-05-16

## 2025-05-16 RX ORDER — SODIUM CHLORIDE 0.9 % (FLUSH) 0.9 %
10 SYRINGE (ML) INJECTION EVERY 6 HOURS PRN
Status: DISCONTINUED | OUTPATIENT
Start: 2025-05-16 | End: 2025-05-17 | Stop reason: HOSPADM

## 2025-05-16 RX ORDER — SODIUM CHLORIDE, SODIUM LACTATE, POTASSIUM CHLORIDE, CALCIUM CHLORIDE 600; 310; 30; 20 MG/100ML; MG/100ML; MG/100ML; MG/100ML
INJECTION, SOLUTION INTRAVENOUS CONTINUOUS
Status: DISCONTINUED | OUTPATIENT
Start: 2025-05-16 | End: 2025-05-16

## 2025-05-16 RX ADMIN — PROPOFOL 50 MG: 10 INJECTION, EMULSION INTRAVENOUS at 01:05

## 2025-05-16 RX ADMIN — SODIUM CHLORIDE 1750 MG: 9 INJECTION, SOLUTION INTRAVENOUS at 08:05

## 2025-05-16 RX ADMIN — METRONIDAZOLE 500 MG: 500 TABLET ORAL at 05:05

## 2025-05-16 RX ADMIN — METRONIDAZOLE 500 MG: 500 TABLET ORAL at 09:05

## 2025-05-16 RX ADMIN — DEXTROSE AND SODIUM CHLORIDE: 5; 450 INJECTION, SOLUTION INTRAVENOUS at 03:05

## 2025-05-16 RX ADMIN — PROPOFOL 100 MG: 10 INJECTION, EMULSION INTRAVENOUS at 01:05

## 2025-05-16 RX ADMIN — HEPARIN SODIUM 5000 UNITS: 5000 INJECTION, SOLUTION INTRAVENOUS; SUBCUTANEOUS at 05:05

## 2025-05-16 RX ADMIN — LIDOCAINE HYDROCHLORIDE 100 MG: 20 INJECTION, SOLUTION EPIDURAL; INFILTRATION; INTRACAUDAL; PERINEURAL at 01:05

## 2025-05-16 RX ADMIN — CEFTRIAXONE SODIUM 1 G: 1 INJECTION, POWDER, FOR SOLUTION INTRAMUSCULAR; INTRAVENOUS at 08:05

## 2025-05-16 RX ADMIN — DEXTROSE AND SODIUM CHLORIDE: 5; 450 INJECTION, SOLUTION INTRAVENOUS at 10:05

## 2025-05-16 RX ADMIN — METRONIDAZOLE 500 MG: 500 TABLET ORAL at 03:05

## 2025-05-16 RX ADMIN — HEPARIN SODIUM 5000 UNITS: 5000 INJECTION, SOLUTION INTRAVENOUS; SUBCUTANEOUS at 09:05
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
## 2025-05-16 NOTE — ED NOTES
Patient called out and states IV bleeding.  IV had come apart at hub but once tape pulled down, jelco come out with catheter intact.  IV removed and pressure applied.     11-May-2022 15:44

## 2025-05-16 NOTE — PROGRESS NOTES
"[facility] - [facility] Hospital Medicine  Progress Note    Patient Name: Heather Rockwell  MRN: [id number]  Patient Class: IP- Inpatient   Admission Date: 5/15/2025  Length of Stay: 1 days  Attending Physician: Shantel Dawn DO  Primary Care Provider: Karlie, Primary Doctor        Subjective     Principal Problem:Ileus      HPI:  65 year old female presents with abdominal pain, nausea, and vomiting since last night.  She was also having loose BM's.  In the ER, CT scan shows ileus/SBO.      Overview/Hospital Course:  65 year old female presents with abdominal pain, nausea, and vomiting due to SBO/ileus and possible biliary obstruction; still complaining of abdominal discomfort    Vitals:    05/16/25 1400 05/16/25 1405 05/16/25 1533 05/16/25 1548   BP: (!) 125/56 (!) 145/69  (!) 145/78   BP Location:    Left arm   Patient Position:    Sitting   Pulse: 61 (!) 59  66   Resp: 14 15  18   Temp:   98.5 °F (36.9 °C) 98.6 °F (37 °C)   TempSrc:   Oral Oral   SpO2: 100% 100%  97%   Weight:   86.6 kg (191 lb)    Height:   5' 6" (1.676 m)          PHYSICAL EXAM:    GEN: NAD; alert and oriented x 3  CVS: regular rate and rhythm; no murmurs  RESP: clear to auscultation bilaterally; no rhonchi, rales, or wheezes noted  GI: mildly tender, mildly distended; + bowel sounds  EXTR: no clubbing, cyanosis, or edema        Assessment & Plan  Small bowel obstruction  Appreciate consult from Surgery; on CLD; CT scan showed possible ileus/SBO; she also has possible colitis; on Vancomycin, Rocephin, and Flagyl; negative C diff and Enteric pathogen panel; CT showed possible malignancy; normal CEA; appreciate consult from GI; s/p EGD that did not show acute etiology; normal hepatitis panel; on IVF    Common bile duct dilation  Will leave to GI          Shantel Dawn DO  Department of Hospital Medicine   [facility] - [facility]    "

## 2025-05-16 NOTE — PLAN OF CARE
[facility] - Emergency Department  Initial Discharge Assessment       Primary Care Provider: Karlie, Primary Doctor    Admission Diagnosis: SBO (small bowel obstruction) [K56.609]    Admission Date: 5/15/2025  Expected Discharge Date:     Transition of Care Barriers: None    Payor: [facility] MCARE / Plan: [facility] DUAL COMPLETE HMO SNP / Product Type: Medicare Advantage /     No emergency contact information on file.    Discharge Plan A: Home  Discharge Plan B: Home      [facility] [address] [place] [address] [address] [place] [address]  [address] [place] [id number]  Phone: [phone] Fax: [phone]      Initial Assessment (most recent)       Adult Discharge Assessment - 05/16/25 0958          Discharge Assessment    Assessment Type Discharge Planning Assessment     Source of Information patient     Communicated SACHIN with patient/caregiver Date not available/Unable to determine     Facility Arrived From: Home     Do you expect to return to your current living situation? Yes     Do you have help at home or someone to help you manage your care at home? No     Prior to hospitilization cognitive status: Unable to Assess     Current cognitive status: Alert/Oriented     Walking or Climbing Stairs Difficulty no     Dressing/Bathing Difficulty no     Home Accessibility wheelchair accessible     Home Layout Able to live on 1st floor     Equipment Currently Used at Home none     Patient currently being followed by outpatient case management? No     Do you currently have service(s) that help you manage your care at home? No     Do you take prescription medications? No     Do you have prescription coverage? Yes     Do you have any problems affording any of your prescribed medications? TBD     Is the patient taking medications as prescribed? no     How do you get to doctors appointments? other (see comments)   pt reports that she walks to her doctors appointments    Are you on dialysis? No     Do you  take coumadin? No     Discharge Plan A Home     Discharge Plan B Home     DME Needed Upon Discharge  none     Discharge Plan discussed with: Patient     Transition of Care Barriers None        Physical Activity    On average, how many days per week do you engage in moderate to strenuous exercise (like a brisk walk)? 0 days     On average, how many minutes do you engage in exercise at this level? 0 min        Financial Resource Strain    How hard is it for you to pay for the very basics like food, housing, medical care, and heating? Somewhat hard        Housing Stability    In the last 12 months, was there a time when you were not able to pay the mortgage or rent on time? Yes     In the past 12 months, how many times have you moved where you were living? --   she said too many to count    At any time in the past 12 months, were you homeless or living in a shelter (including now)? Yes        Transportation Needs    In the past 12 months, has lack of transportation kept you from medical appointments or from getting medications? Yes     In the past 12 months, has lack of transportation kept you from meetings, work, or from getting things needed for daily living? Yes        Food Insecurity    Within the past 12 months, you worried that your food would run out before you got the money to buy more. Often true     Within the past 12 months, the food you bought just didn't last and you didn't have money to get more. Often true        Stress    Do you feel stress - tense, restless, nervous, or anxious, or unable to sleep at night because your mind is troubled all the time - these days? To some extent        Alcohol Use    Q1: How often do you have a drink containing alcohol? Never     Q2: How many drinks containing alcohol do you have on a typical day when you are drinking? Patient does not drink     Q3: How often do you have six or more drinks on one occasion? Never        nextsocialities    In the past 12 months has the electric,  gas, oil, or water company threatened to shut off services in your home? No        Health Literacy    How often do you need to have someone help you when you read instructions, pamphlets, or other written material from your doctor or pharmacy? Never                        CM spoke with pt in the ER. Pt is currently renting a room at a apartment house. Pt not current with home health, No dme pta. Discharge plan per pt is to return home with no anticipated needs. CM will follow for discharge needs as they arise. SDOH updated.

## 2025-05-16 NOTE — CONSULTS
[facility] - Emergency Department  Gastroenterology  Consult Note    Patient Name: Heather Rockwell  MRN: [id number]  Admission Date: 5/15/2025  Hospital Length of Stay: 1 days  Code Status: Full Code   Attending Provider: Shantel Dawn DO   Consulting Provider: Ashkan Holden MD  Primary Care Physician: Karlie, Primary Doctor  Principal Problem:Ileus    Inpatient consult to Gastroenterology  Consult performed by: Ashkan Holden MD  Consult ordered by: Shantel Dawn DO  Reason for consult: Nausea, diarrhea        Subjective:     HPI:  This patient is a 65-year-old female who presented to the hospital for abdominal pain and diarrhea for the past several days.  She is anemic and denies any gross evidence of GI bleeding.  She states that she has had nausea without vomiting.  Her stool tests were negative for enteric pathogens and Clostridium difficile.    Past Medical History:   Diagnosis Date    Depression     Hypertension        Past Surgical History:   Procedure Laterality Date    CHOLECYSTECTOMY      FRACTURE SURGERY Left     left foot broke in 3 places    HERNIA REPAIR      HYSTERECTOMY         Review of patient's allergies indicates:  No Known Allergies  Family History    None       Tobacco Use    Smoking status: Never    Smokeless tobacco: Never   Substance and Sexual Activity    Alcohol use: Never    Drug use: Never    Sexual activity: Not Currently     Review of Systems   Constitutional: Negative.    HENT: Negative.     Respiratory: Negative.     Cardiovascular: Negative.    Gastrointestinal:  Positive for diarrhea and nausea.     Objective:     Vital Signs (Most Recent):  Temp: 98.2 °F (36.8 °C) (05/16/25 1311)  Pulse: (!) 56 (05/16/25 1311)  Resp: 13 (05/16/25 1311)  BP: (!) 140/62 (05/16/25 1311)  SpO2: 100 % (05/16/25 1311) Vital Signs (24h Range):  Temp:  [97.3 °F (36.3 °C)-98.6 °F (37 °C)] 98.2 °F (36.8 °C)  Pulse:  [56-68] 56  Resp:  [13-16] 13  SpO2:  [98 %-100 %] 100  %  BP: (119-144)/(62-83) 140/62     Weight: 86.6 kg (191 lb) (05/16/25 1311)  Body mass index is 30.83 kg/m².      Intake/Output Summary (Last 24 hours) at 5/16/2025 1331  Last data filed at 5/16/2025 0021  Gross per 24 hour   Intake 1500 ml   Output --   Net 1500 ml       Lines/Drains/Airways       Peripheral Intravenous Line  Duration                  Peripheral IV - Single Lumen 05/16/25 1118 20 G No Anterior;Proximal;Right Forearm <1 day                    Physical Exam  Vitals reviewed.   Constitutional:       General: She is not in acute distress.     Appearance: Normal appearance. She is well-developed. She is not ill-appearing.   HENT:      Head: Normocephalic and atraumatic.      Nose: Nose normal.   Eyes:      Pupils: Pupils are equal, round, and reactive to light.   Cardiovascular:      Rate and Rhythm: Normal rate and regular rhythm.   Pulmonary:      Effort: Pulmonary effort is normal.      Breath sounds: Normal breath sounds. No wheezing.   Abdominal:      General: Abdomen is flat. Bowel sounds are normal. There is no distension.      Palpations: Abdomen is soft.      Tenderness: There is no abdominal tenderness. There is no guarding.   Skin:     General: Skin is warm and dry.      Coloration: Skin is not jaundiced.   Neurological:      Mental Status: She is alert.   Psychiatric:         Attention and Perception: Attention normal.         Mood and Affect: Affect normal.         Speech: Speech normal.         Behavior: Behavior is cooperative.      Comments: Pt was calm while speaking.         Significant Labs:  CBC:   Recent Labs   Lab 05/15/25  1348   WBC 9.64   HGB 9.5*   HCT 32.4*   *     CMP:   Recent Labs   Lab 05/15/25  1348      CALCIUM 9.0   ALBUMIN 3.5   PROT 8.2*   *   K 4.6   CO2 20*      BUN 28*   CREATININE 0.89   ALKPHOS 128   ALT 13   AST 25   BILITOT 0.4       Significant Imaging:  Imaging results within the past 24 hours have been reviewed.    Assessment/Plan:      Active Diagnoses:    Diagnosis Date Noted POA    PRINCIPAL PROBLEM:  Ileus [K56.7] 05/15/2025 Yes    Small bowel obstruction [K56.609] 05/16/2025 Unknown      Problems Resolved During this Admission:       Impression:  Abnormal abdominal x-ray, diarrhea-acute diarrheal illness, nausea, anemia  Recommendation:  EGD today, check iron studies, consider colonoscopy.    Thank you for your consult. I will follow-up with patient. Please contact us if you have any additional questions.    Ashkan Holden MD  Gastroenterology  [facility] - Emergency Department

## 2025-05-16 NOTE — ASSESSMENT & PLAN NOTE
05/15/25 Discussed with Dr Rodriguez who has also evaluated patient.  Abd xray shows possible small bowel obstruction.    Ct abd and pelvis: Dilation of a short segment of small bowel in the left upper quadrant with a gradual transition to normal caliber small bowel.  The differential for this finding includes focal ileus versus early/partial small bowel obstruction.  2. Thickening and hyperenhancement of the wall of a short segment of the transverse colon in the left upper quadrant, nonspecific.  While this may represent an infectious or inflammatory colitis, malignancy is difficult to exclude.  Colonoscopy could be considered if not otherwise recently performed.  3. Dilation of the common bile duct up to 15 mm.  If there is any clinical concern for biliary obstruction, consider MRCP/ERCP  Conservative management from surgery standpoint. Okay to advance to clear liquid diet when medicine deems appropriate.  GI consult also in place.     5/17:  Patient doing well.  Advance to regular diet.  Possible discharge tomorrow

## 2025-05-16 NOTE — ANESTHESIA POSTPROCEDURE EVALUATION
Anesthesia Post Evaluation    Patient: Heather Rockwell    Procedure(s) Performed: * No procedures listed *    Final Anesthesia Type: MAC      Patient location during evaluation: GI PACU  Patient participation: Yes- Able to Participate  Level of consciousness: awake and alert  Post-procedure vital signs: reviewed and stable  Pain management: adequate  Airway patency: patent    PONV status at discharge: No PONV  Anesthetic complications: no      Cardiovascular status: blood pressure returned to baseline  Respiratory status: unassisted  Hydration status: euvolemic  Follow-up not needed.              Vitals Value Taken Time   /69 05/16/25 14:14   Temp 36.7 °C (98 °F) 05/16/25 13:56   Pulse 54 05/16/25 14:15   Resp 14 05/16/25 14:15   SpO2 98 % 05/16/25 14:14   Vitals shown include unfiled device data.      No case tracking events are documented in the log.      Pain/Rodo Score: Rodo Score: 10 (5/16/2025  2:00 PM)

## 2025-05-16 NOTE — PHARMACY MED REC
"Admission Medication History     The home medication history was taken by Shavon Maradiaga.    You may go to "Admission" then "Reconcile Home Medications" tabs to review and/or act upon these items.     The home medication list has been updated by the Pharmacy department.   Please read ALL comments highlighted in yellow.   Please address this information as you see fit.    Feel free to contact us if you have any questions or require assistance.  Patient mentioned she sometimes takes a multivitamin.      Patient reports no longer taking the following medication(s). The medication(s) listed below were removed from the home medication list. Please reorder if appropriate:  Albuterol 90 mcg inhaler  Famotidine 20 mg  Ondansetron 4 mg    Medications listed below were obtained from: Patient/family and Analytic software- [employer]  (Not in a hospital admission)        Current Outpatient Medications on File Prior to Encounter   Medication Sig Dispense Refill Last Dose/Taking    [DISCONTINUED] albuterol (PROVENTIL/VENTOLIN HFA) 90 mcg/actuation inhaler Inhale 2 puffs into the lungs every 4 (four) hours as needed for Wheezing. Rescue 8 g 0     [DISCONTINUED] famotidine (PEPCID) 20 MG tablet Take 1 tablet (20 mg total) by mouth 2 (two) times daily. for 14 days 28 tablet 0     [DISCONTINUED] ondansetron (ZOFRAN) 4 MG tablet Take 1 tablet (4 mg total) by mouth every 6 (six) hours. 12 tablet 0          Potential issues to be addressed PRIOR TO DISCHARGE  No issues identified.    Shavon Maradiaga  Medication Access Specialist  EXT. [id number]    .          "

## 2025-05-16 NOTE — ASSESSMENT & PLAN NOTE
Appreciate consult from Surgery; on CLD; CT scan showed possible ileus/SBO; she also has possible colitis; on Vancomycin, Rocephin, and Flagyl; negative C diff and Enteric pathogen panel; CT showed possible malignancy; normal CEA; appreciate consult from GI; s/p EGD that did not show acute etiology; normal hepatitis panel; on IVF

## 2025-05-16 NOTE — PROGRESS NOTES
Initial Pharmacy Consult    Consulted to assist in the management of Vanc therapy in this 64 y/o female with SSTI.     Labs: BUN    28            SCR:   0.89            CRCL: 68    Based on the patient's weight of 87kg and the most recent lab data available, the following therapy will be initiated: Vanc 1750mg iv q24hr . Will check Vanc trough prior to the 3rd dose on 5/17 and make adjustments if necessary.  Will Continue to monitor.    ELIZABETH Richards.Ph.

## 2025-05-16 NOTE — ANESTHESIA PREPROCEDURE EVALUATION
05/16/2025  Heather Rockwell is a 65 y.o., female.      Pre-op Assessment    I have reviewed the Patient Summary Reports.    I have reviewed the NPO Status.   I have reviewed the Medications.     Review of Systems  Social:  Non-Smoker, No Alcohol Use       Hematology/Oncology:  Hematology Normal   Oncology Normal                                   EENT/Dental:  EENT/Dental Normal           Cardiovascular:     Hypertension                                          Pulmonary:  Pulmonary Normal                       Renal/:  Renal/ Normal                 Hepatic/GI:        Pt admitted with abdominal pain/diarrhea             Endocrine:        Obesity / BMI > 30  Psych:  Psychiatric History  depression                Physical Exam  General: Well nourished, Cooperative and Alert    Airway:  Mallampati: II   Mouth Opening: Normal  TM Distance: Normal  Neck ROM: Normal ROM    Dental:  Intact        Anesthesia Plan  Type of Anesthesia, risks & benefits discussed:    Anesthesia Type: MAC  Intra-op Monitoring Plan: Standard ASA Monitors  Post Op Pain Control Plan: multimodal analgesia  Induction:  IV  Informed Consent: Informed consent signed with the Patient and all parties understand the risks and agree with anesthesia plan.  All questions answered.   ASA Score: 2  Day of Surgery Review of History & Physical: H&P Update referred to the surgeon/provider.I have interviewed and examined the patient. I have reviewed the patient's H&P dated: There are no significant changes.     Ready For Surgery From Anesthesia Perspective.     .
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
## 2025-05-16 NOTE — TRANSFER OF CARE
"Anesthesia Transfer of Care Note    Patient: Heather Rockwell    Procedure(s) Performed: * No procedures listed *    Patient location: GI    Anesthesia Type: MAC    Transport from OR: Transported from OR on room air with adequate spontaneous ventilation. Continuous ECG monitoring in transport. Continuous SpO2 monitoring in transport    Post pain: adequate analgesia    Post assessment: no apparent anesthetic complications    Post vital signs: stable    Level of consciousness: sedated    Nausea/Vomiting: no nausea/vomiting    Complications: none    Transfer of care protocol was followed      Last vitals: Visit Vitals  BP (!) 113/55   Pulse (!) 56   Temp 36.7 °C (98 °F) (Skin)   Resp 16   Ht 5' 6" (1.676 m)   Wt 86.6 kg (191 lb)   SpO2 97%   Breastfeeding No   BMI 30.83 kg/m²     "

## 2025-05-17 VITALS
BODY MASS INDEX: 30.7 KG/M2 | WEIGHT: 191 LBS | TEMPERATURE: 98 F | SYSTOLIC BLOOD PRESSURE: 165 MMHG | HEART RATE: 55 BPM | DIASTOLIC BLOOD PRESSURE: 85 MMHG | OXYGEN SATURATION: 99 % | HEIGHT: 66 IN | RESPIRATION RATE: 18 BRPM

## 2025-05-17 PROBLEM — D50.9 IRON DEFICIENCY ANEMIA: Status: ACTIVE | Noted: 2025-05-17

## 2025-05-17 LAB
C DIFF TOX A+B STL QL IA: NEGATIVE
CLOSTRIDIOIDES DIFFICILE GDH ANTIGEN (OHS): NEGATIVE

## 2025-05-17 PROCEDURE — S5010 5% DEXTROSE AND 0.45% SALINE: HCPCS | Performed by: HOSPITALIST

## 2025-05-17 PROCEDURE — 25000003 PHARM REV CODE 250: Performed by: INTERNAL MEDICINE

## 2025-05-17 PROCEDURE — 63600175 PHARM REV CODE 636 W HCPCS: Performed by: HOSPITALIST

## 2025-05-17 PROCEDURE — 99232 SBSQ HOSP IP/OBS MODERATE 35: CPT | Mod: ,,, | Performed by: INTERNAL MEDICINE

## 2025-05-17 PROCEDURE — 99232 SBSQ HOSP IP/OBS MODERATE 35: CPT | Mod: ,,, | Performed by: FAMILY MEDICINE

## 2025-05-17 PROCEDURE — 63600175 PHARM REV CODE 636 W HCPCS: Performed by: INTERNAL MEDICINE

## 2025-05-17 PROCEDURE — 25000003 PHARM REV CODE 250: Performed by: HOSPITALIST

## 2025-05-17 PROCEDURE — 99233 SBSQ HOSP IP/OBS HIGH 50: CPT | Mod: ,,, | Performed by: STUDENT IN AN ORGANIZED HEALTH CARE EDUCATION/TRAINING PROGRAM

## 2025-05-17 RX ORDER — POLYETHYLENE GLYCOL 3350, SODIUM SULFATE ANHYDROUS, SODIUM BICARBONATE, SODIUM CHLORIDE, POTASSIUM CHLORIDE 236; 22.74; 6.74; 5.86; 2.97 G/4L; G/4L; G/4L; G/4L; G/4L
4000 POWDER, FOR SOLUTION ORAL ONCE
Status: DISCONTINUED | OUTPATIENT
Start: 2025-05-18 | End: 2025-05-17 | Stop reason: HOSPADM

## 2025-05-17 RX ORDER — LORAZEPAM 0.5 MG/1
0.5 TABLET ORAL EVERY 6 HOURS PRN
Status: DISCONTINUED | OUTPATIENT
Start: 2025-05-17 | End: 2025-05-17 | Stop reason: HOSPADM

## 2025-05-17 RX ADMIN — HEPARIN SODIUM 5000 UNITS: 5000 INJECTION, SOLUTION INTRAVENOUS; SUBCUTANEOUS at 05:05

## 2025-05-17 RX ADMIN — METRONIDAZOLE 500 MG: 500 TABLET ORAL at 05:05

## 2025-05-17 RX ADMIN — DEXTROSE AND SODIUM CHLORIDE: 5; 450 INJECTION, SOLUTION INTRAVENOUS at 05:05

## 2025-05-17 RX ADMIN — SODIUM CHLORIDE 125 MG: 9 INJECTION, SOLUTION INTRAVENOUS at 12:05

## 2025-05-17 NOTE — PLAN OF CARE
Problem: Adult Inpatient Plan of Care  Goal: Plan of Care Review  Outcome: Progressing  Flowsheets (Taken 5/16/2025 1933)  Plan of Care Reviewed With: patient  Goal: Patient-Specific Goal (Individualized)  Outcome: Progressing  Goal: Absence of Hospital-Acquired Illness or Injury  Outcome: Progressing  Intervention: Identify and Manage Fall Risk  Flowsheets (Taken 5/16/2025 1933)  Safety Promotion/Fall Prevention: assistive device/personal item within reach  Goal: Optimal Comfort and Wellbeing  Outcome: Progressing  Goal: Readiness for Transition of Care  Outcome: Progressing     Problem: Infection  Goal: Absence of Infection Signs and Symptoms  Outcome: Progressing  Intervention: Prevent or Manage Infection  Flowsheets (Taken 5/16/2025 1933)  Infection Management: aseptic technique maintained  Isolation Precautions: precautions maintained

## 2025-05-17 NOTE — SUBJECTIVE & OBJECTIVE
Interval History:     No significant events overnight, no new complaints or concerns. GI with recommendations for inpatient colonoscopy. Patient eager for discharge largely secondary to paranoia and delusions. May benefit from tele-psychiatry.       Objective:     Vital Signs (Most Recent):  Temp: 97.8 °F (36.6 °C) (05/17/25 1534)  Pulse: (!) 55 (05/17/25 1534)  Resp: 18 (05/17/25 0957)  BP: (!) 165/85 (05/17/25 1534)  SpO2: 99 % (05/17/25 1534) Vital Signs (24h Range):  Temp:  [97.3 °F (36.3 °C)-98.2 °F (36.8 °C)] 97.8 °F (36.6 °C)  Pulse:  [49-67] 55  Resp:  [16-20] 18  SpO2:  [98 %-100 %] 99 %  BP: (145-165)/(74-88) 165/85  Arterial Line BP: (146)/(88) 146/88     Weight: 86.6 kg (191 lb)  Body mass index is 30.83 kg/m².    Intake/Output Summary (Last 24 hours) at 5/17/2025 1642  Last data filed at 5/16/2025 1806  Gross per 24 hour   Intake 240 ml   Output --   Net 240 ml         Physical Exam  Constitutional:       General: She is not in acute distress.     Appearance: Normal appearance.   HENT:      Head: Normocephalic.   Cardiovascular:      Rate and Rhythm: Normal rate.   Pulmonary:      Effort: Pulmonary effort is normal. No respiratory distress.   Abdominal:      General: There is no distension.      Tenderness: There is no abdominal tenderness.   Musculoskeletal:         General: Normal range of motion.   Skin:     General: Skin is warm.      Coloration: Skin is not jaundiced.   Neurological:      General: No focal deficit present.      Mental Status: She is alert and oriented to person, place, and time.      Cranial Nerves: No cranial nerve deficit.   Psychiatric:         Mood and Affect: Mood is anxious.         Thought Content: Thought content is paranoid.               Significant Labs: All pertinent labs within the past 24 hours have been reviewed.    Significant Imaging: I have reviewed all pertinent imaging results/findings within the past 24 hours.
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
## 2025-05-17 NOTE — ASSESSMENT & PLAN NOTE
Anemia is likely due to undetermined. Most recent hemoglobin and hematocrit are listed below.  Recent Labs     05/15/25  1348   HGB 9.5*   HCT 32.4*     Plan  - Monitor serial CBC: Daily  - Transfuse PRBC if patient becomes hemodynamically unstable, symptomatic or H/H drops below 7/21.  - Patient has not received any PRBC transfusions to date  - Patient's anemia is currently stable  -

## 2025-05-17 NOTE — SUBJECTIVE & OBJECTIVE
Interval History:  Stable, no acute events overnight.  Continued to have diarrhea; tolerating clear liquids; asking for more food    Medications:  Continuous Infusions:   D5 and 0.45% NaCl   Intravenous Continuous 100 mL/hr at 05/17/25 0512 New Bag at 05/17/25 0512     Scheduled Meds:   cefTRIAXone (Rocephin) IV (PEDS and ADULTS)  1 g Intravenous Q24H    heparin (porcine)  5,000 Units Subcutaneous Q8H    metroNIDAZOLE  500 mg Oral Q8H    vancomycin (VANCOCIN) IV (PEDS and ADULTS)  1,750 mg Intravenous Q24H     PRN Meds:  Current Facility-Administered Medications:     acetaminophen, 650 mg, Oral, Q4H PRN    albuterol, 2 puff, Inhalation, Q4H PRN    dextrose 50%, 12.5 g, Intravenous, PRN    dextrose 50%, 25 g, Intravenous, PRN    glucagon (human recombinant), 1 mg, Intramuscular, PRN    glucose, 16 g, Oral, PRN    glucose, 24 g, Oral, PRN    naloxone, 0.02 mg, Intravenous, PRN    ondansetron, 4 mg, Intravenous, Q8H PRN    ondansetron, 4 mg, Intravenous, Q6H PRN    prochlorperazine, 2.5 mg, Intravenous, Q6H PRN    sodium chloride 0.9%, 10 mL, Intravenous, Q12H PRN    sodium chloride 0.9%, 10 mL, Intravenous, Q6H PRN    vancomycin - pharmacy to dose, , Intravenous, pharmacy to manage frequency     Review of patient's allergies indicates:  No Known Allergies  Objective:     Vital Signs (Most Recent):  Temp: 98.2 °F (36.8 °C) (05/17/25 0719)  Pulse: (!) 49 (05/17/25 0719)  Resp: 18 (05/17/25 0422)  BP: (!) 146/88 (05/17/25 0719)  SpO2: 99 % (05/17/25 0719) Vital Signs (24h Range):  Temp:  [97.3 °F (36.3 °C)-98.6 °F (37 °C)] 98.2 °F (36.8 °C)  Pulse:  [49-66] 49  Resp:  [13-20] 18  SpO2:  [97 %-100 %] 99 %  BP: (113-157)/(55-88) 146/88     Weight: 86.6 kg (191 lb)  Body mass index is 30.83 kg/m².    Intake/Output - Last 3 Shifts         05/15 0700  05/16 0659 05/16 0700 05/17 0659 05/17 0700  05/18 0659    P.O.  240     I.V. (mL/kg) 1000 (11.5)      IV Piggyback 500      Total Intake(mL/kg) 1500 (17.3) 240 (2.8)     Net  +1500 +240            Unmeasured Stool Occurrence  1 x              Physical Exam  Constitutional:       General: She is not in acute distress.     Appearance: Normal appearance.   HENT:      Head: Normocephalic.   Cardiovascular:      Rate and Rhythm: Normal rate.   Pulmonary:      Effort: Pulmonary effort is normal. No respiratory distress.   Abdominal:      General: There is no distension.      Tenderness: There is no abdominal tenderness.   Musculoskeletal:         General: Normal range of motion.   Skin:     General: Skin is warm.      Coloration: Skin is not jaundiced.   Neurological:      General: No focal deficit present.      Mental Status: She is alert and oriented to person, place, and time.      Cranial Nerves: No cranial nerve deficit.          Significant Labs:  I have reviewed all pertinent lab results within the past 24 hours.  CBC:   Recent Labs   Lab 05/15/25  1348   WBC 9.64   RBC 4.64   HGB 9.5*   HCT 32.4*   *   MCV 69.8*   MCH 20.5*   MCHC 29.3*     BMP:   Recent Labs   Lab 05/15/25  1348      *   K 4.6      CO2 20*   BUN 28*   CREATININE 0.89   CALCIUM 9.0       Significant Diagnostics:  I have reviewed all pertinent imaging results/findings within the past 24 hours.
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
## 2025-05-17 NOTE — NURSING
"Pt voiced to CNA that she had removed her IV and someone put something on her door so she was "tired of this bullshit" and leaving. Dr Corado notified.       Dr Corado up to see patient. Pt has stripped the bed and room and packed the linens and belongings into her suitcase. She declines all advice to stay and decides to leave AMA.     Pt IV was already dc'd per self. Pt left after signing AMA form.   "

## 2025-05-17 NOTE — PROGRESS NOTES
[facility] Hospital Medicine  Progress Note    Patient Name: Heather Rockwell  MRN: [id number]  Patient Class: IP- Inpatient   Admission Date: 5/15/2025  Length of Stay: 2 days  Attending Physician: Steff Corado DO  Primary Care Provider: Karlie, Primary Doctor        Subjective     Principal Problem:Small bowel obstruction        HPI:  65 year old female presents with abdominal pain, nausea, and vomiting since last night.  She was also having loose BM's.  In the ER, CT scan shows ileus/SBO.      Overview/Hospital Course:  65 year old female presents with abdominal pain, nausea, and vomiting due to SBO/ileus and possible biliary obstruction; still complaining of abdominal discomfort    Interval History:     No significant events overnight, no new complaints or concerns. GI with recommendations for inpatient colonoscopy. Patient eager for discharge largely secondary to paranoia and delusions. May benefit from tele-psychiatry.       Objective:     Vital Signs (Most Recent):  Temp: 97.8 °F (36.6 °C) (05/17/25 1534)  Pulse: (!) 55 (05/17/25 1534)  Resp: 18 (05/17/25 0957)  BP: (!) 165/85 (05/17/25 1534)  SpO2: 99 % (05/17/25 1534) Vital Signs (24h Range):  Temp:  [97.3 °F (36.3 °C)-98.2 °F (36.8 °C)] 97.8 °F (36.6 °C)  Pulse:  [49-67] 55  Resp:  [16-20] 18  SpO2:  [98 %-100 %] 99 %  BP: (145-165)/(74-88) 165/85  Arterial Line BP: (146)/(88) 146/88     Weight: 86.6 kg (191 lb)  Body mass index is 30.83 kg/m².    Intake/Output Summary (Last 24 hours) at 5/17/2025 1642  Last data filed at 5/16/2025 1806  Gross per 24 hour   Intake 240 ml   Output --   Net 240 ml         Physical Exam  Constitutional:       General: She is not in acute distress.     Appearance: Normal appearance.   HENT:      Head: Normocephalic.   Cardiovascular:      Rate and Rhythm: Normal rate.   Pulmonary:      Effort: Pulmonary effort is normal. No respiratory distress.   Abdominal:      General: There is no  distension.      Tenderness: There is no abdominal tenderness.   Musculoskeletal:         General: Normal range of motion.   Skin:     General: Skin is warm.      Coloration: Skin is not jaundiced.   Neurological:      General: No focal deficit present.      Mental Status: She is alert and oriented to person, place, and time.      Cranial Nerves: No cranial nerve deficit.   Psychiatric:         Mood and Affect: Mood is anxious.         Thought Content: Thought content is paranoid.               Significant Labs: All pertinent labs within the past 24 hours have been reviewed.    Significant Imaging: I have reviewed all pertinent imaging results/findings within the past 24 hours.      Assessment & Plan  Small bowel obstruction  Appreciate consult from Surgery; on CLD; CT scan showed possible ileus/SBO; she also has possible colitis; on Vancomycin, Rocephin, and Flagyl; negative C diff and Enteric pathogen panel; CT showed possible malignancy; normal CEA; appreciate consult from GI; s/p EGD that did not show acute etiology; normal hepatitis panel; on IVF    Common bile duct dilation  Will leave to GI    Ileus  Consult Surgery; will place on CLD; CT scan showed possible ileus/SBO; will consult Surgery; she also has possible colitis; will start Vancomycin, Rocephin, and Flagyl; will check C diff and Enteric pathogen panel; CT showed possible malignancy; will check CEA; will consult GI; will start IVF; check occult blood; check hepatitis panel    Nausea      Diarrhea      Chronic anemia  Anemia is likely due to undetermined. Most recent hemoglobin and hematocrit are listed below.  Recent Labs     05/15/25  1348   HGB 9.5*   HCT 32.4*     Plan  - Monitor serial CBC: Daily  - Transfuse PRBC if patient becomes hemodynamically unstable, symptomatic or H/H drops below 7/21.  - Patient has not received any PRBC transfusions to date  - Patient's anemia is currently stable  -  VTE Risk Mitigation (From admission, onward)            Ordered     heparin (porcine) injection 5,000 Units  Every 8 hours         05/15/25 1955     IP VTE HIGH RISK PATIENT  Once         05/15/25 1955     Place sequential compression device  Until discontinued         05/15/25 1955                    Discharge Planning   SACHIN:      Code Status: Full Code   Medical Readiness for Discharge Date:   Discharge Plan A: Home                        Steff Corado DO  Department of Hospital Medicine   [facility]

## 2025-05-17 NOTE — PROGRESS NOTES
[facility]  Gastroenterology  Progress Note    Patient Name: Heather Rockwell  MRN: [id number]  Admission Date: 5/15/2025  Hospital Length of Stay: 2 days  Code Status: Full Code   Attending Provider: Steff Corado DO  Consulting Provider: Ashkan Holden MD  Primary Care Physician: Karlie, Primary Doctor  Principal Problem: Small bowel obstruction    Subjective:     Interval History:  Mrs. Rockwell has stripped her bed on my arrival and she has spoken to the nurse about leaving AMA.  I explained to her that she has iron-deficiency anemia and that her evaluation can take place while she is in the hospital more quickly than if she were to go home and try to schedule this as an outpatient.  She has delusional and paranoid stating that her sister had rats in her body as she thinks that she may have the same.  She states that gang members are out to get her and that hospital employees may be disguise and may harm her.  I tried to alleviate her fears.  Review of Systems   Constitutional:  Positive for activity change.   Respiratory: Negative.     Cardiovascular: Negative.    Gastrointestinal:  Positive for diarrhea.   Psychiatric/Behavioral:  Negative for agitation. The patient is nervous/anxious.      Objective:     Vital Signs (Most Recent):  Temp: 97.6 °F (36.4 °C) (05/17/25 1125)  Pulse: 67 (05/17/25 1125)  Resp: 18 (05/17/25 0957)  BP: (!) 145/81 (05/17/25 1125)  SpO2: 98 % (05/17/25 1125) Vital Signs (24h Range):  Temp:  [97.3 °F (36.3 °C)-98.6 °F (37 °C)] 97.6 °F (36.4 °C)  Pulse:  [49-67] 67  Resp:  [13-20] 18  SpO2:  [97 %-100 %] 98 %  BP: (113-157)/(55-88) 145/81  Arterial Line BP: (146)/(88) 146/88     Weight: 86.6 kg (191 lb) (05/16/25 1533)  Body mass index is 30.83 kg/m².      Intake/Output Summary (Last 24 hours) at 5/17/2025 1156  Last data filed at 5/16/2025 1806  Gross per 24 hour   Intake 240 ml   Output --   Net 240 ml       Lines/Drains/Airways        Peripheral Intravenous Line  Duration                  Peripheral IV - Single Lumen 05/16/25 1118 20 G No Anterior;Proximal;Right Forearm 1 day                    Physical Exam  Vitals reviewed.   Constitutional:       General: She is not in acute distress.     Appearance: She is well-developed. She is not ill-appearing.   HENT:      Head: Normocephalic and atraumatic.      Nose: Nose normal.   Eyes:      Pupils: Pupils are equal, round, and reactive to light.   Cardiovascular:      Rate and Rhythm: Normal rate and regular rhythm.   Pulmonary:      Effort: Pulmonary effort is normal.      Breath sounds: Normal breath sounds. No wheezing.   Abdominal:      General: Abdomen is flat. Bowel sounds are normal. There is no distension.      Palpations: Abdomen is soft.      Tenderness: There is no abdominal tenderness. There is no guarding.   Skin:     General: Skin is warm and dry.      Coloration: Skin is not jaundiced.   Neurological:      Mental Status: She is alert.   Psychiatric:         Attention and Perception: Attention normal.         Mood and Affect: Affect normal.         Speech: Speech normal.         Behavior: Behavior is cooperative.      Comments: Pt was calm while speaking.         Significant Labs:  CBC:   Recent Labs   Lab 05/15/25  1348   WBC 9.64   HGB 9.5*   HCT 32.4*   *     CMP:   Recent Labs   Lab 05/15/25  1348      CALCIUM 9.0   ALBUMIN 3.5   PROT 8.2*   *   K 4.6   CO2 20*      BUN 28*   CREATININE 0.89   ALKPHOS 128   ALT 13   AST 25   BILITOT 0.4         Significant Imaging:  Imaging results within the past 24 hours have been reviewed.    Assessment/Plan:     Active Diagnoses:    Diagnosis Date Noted POA    PRINCIPAL PROBLEM:  Small bowel obstruction [K56.609] 05/16/2025 Yes    Iron deficiency anemia [D50.9] 05/17/2025 Unknown    Common bile duct dilation [K83.8] 05/16/2025 Yes      Problems Resolved During this Admission:    Diagnosis Date Noted Date Resolved POA     Ileus [K56.7] 05/15/2025 05/16/2025 Yes       Impression:  Iron-deficiency anemia, abnormal CT abdomen, diarrhea    Plan:  I did order Ativan for agitation.  I have ordered a bowel prep with clear liquid starting this evening in anticipation of colonoscopy on Monday.  I did order IV iron due to iron-deficiency anemia.    Thank you for your consult. I will follow-up with patient. Please contact us if you have any additional questions.    Ashkan Holden MD  Gastroenterology  [facility]

## 2025-05-19 ENCOUNTER — TELEPHONE (OUTPATIENT)
Dept: GASTROENTEROLOGY | Facility: CLINIC | Age: 65
End: 2025-05-19
Payer: MEDICARE

## 2025-05-19 ENCOUNTER — RESULTS FOLLOW-UP (OUTPATIENT)
Dept: GASTROENTEROLOGY | Facility: CLINIC | Age: 65
End: 2025-05-19
Payer: MEDICARE

## 2025-05-19 LAB
ESTROGEN SERPL-MCNC: NORMAL PG/ML
INSULIN SERPL-ACNC: NORMAL U[IU]/ML
LAB AP GROSS DESCRIPTION: NORMAL
LAB AP LABORATORY NOTES: NORMAL
T3RU NFR SERPL: NORMAL %

## 2025-05-19 NOTE — TELEPHONE ENCOUNTER
Attempted to call results. Left message.          ----- Message from Ashkan Holden MD sent at 5/19/2025 10:46 AM CDT -----  EGD biopsy showed normal duodenal tissue.  Recommendation:  Scheduled colonoscopy for iron-deficiency anemia.  The patient left the hospital AMA and did not stay for an inpatient colonoscopy.  ----- Message -----  From: Lab, Background User  Sent: 5/19/2025   8:32 AM CDT  To: Ashkan Holden MD

## 2025-05-19 NOTE — TELEPHONE ENCOUNTER
Attempted to call results. Left message.          ----- Message from Askhan Holden MD sent at 5/19/2025 10:46 AM CDT -----  EGD biopsy showed normal duodenal tissue.  Recommendation:  Scheduled colonoscopy for iron-deficiency anemia.  The patient left the hospital AMA and did not stay for an inpatient colonoscopy.  ----- Message -----  From: Lab, Background User  Sent: 5/19/2025   8:32 AM CDT  To: Ashkan Holden MD

## 2025-05-19 NOTE — LETTER
"     May 20, 2025    Heather Rockwell  [address]  [place] [address]             [facility] G[facility]enter[facility]  [address] [facility] [place] [address]  Phone: [phone] Dear Ms. Heather Rockwell:    Below are the results from your recent visit:    Resulted Orders   Surgical Pathology   Result Value Ref Range    Case Report       Surgical Pathology                                Case: [id number]                                   Authorizing Provider:  Ashkan Holden MD     Collected:           05/16/2025 01:41 PM          Ordering Location:     [facility] -     Received:            05/16/2025 02:06 PM                                 Emergency Department                                                         Pathologist:           Negrito Rhodes MD                                                      Specimen:    Duodenum, Jar #1 - Duodenal biopsies                                                       Final Diagnosis       A. Duodenum, biopsy:  - Duodenal mucosa without diagnostic abnormality  - No evidence of gluten-sensitive enteropathy      Gross Description       A. Duodenum: Jar #1 - Duodenal biopsies  The specimens are received in formalin designated "Duodenal biopsies" and consist of 2 pink-tan tissue fragments (0.2 and 0.3 cm) entirely submitted in cassette A1.    Grossing was completed by Preston Lopez.      Microscopic Description       A microscopic examination was performed and the diagnosis reflects the findings.          Laboratory Notes       If this report includes immunohistochemical (IHC) test results, please note the following: IHC studies were interpreted in conjunction with appropriate positive and negative controls which demonstrate the expected positive and negative reactivity. This laboratory is regulated under CLIA as qualified to perform high-complexity testing. IHC tests are used for clinical purposes. They should not be regarded as " investigational or research.         Your results are   EGD biopsy showed normal duodenal tissue.  Recommendation:  Schedule colonoscopy for iron-deficiency anemia. Multiple attempts have been made to reach you by phone. Please contact our office to schedule Colonoscopy thank you.      .  Please call to schedule an appointment and don't hesitate to call our office if you have any questions or concerns.      Sincerely,        Ashkan Holden MD

## 2025-05-19 NOTE — PROGRESS NOTES
EGD biopsy showed normal duodenal tissue.  Recommendation:  Scheduled colonoscopy for iron-deficiency anemia.  The patient left the hospital AMA and did not stay for an inpatient colonoscopy.

## 2025-05-19 NOTE — PLAN OF CARE
[facility]  Discharge Final Note    Primary Care Provider: No, Primary Doctor    Expected Discharge Date:     Final Discharge Note (most recent)       Final Note - 05/19/25 0847          Final Note    Assessment Type Final Discharge Note     Anticipated Discharge Disposition Home or Self Care                     Important Message from Medicare  Important Message from Medicare regarding Discharge Appeal Rights: Given to patient/caregiver, Explained to patient/caregiver, Signed/date by patient/caregiver     Date IMM was signed: 05/16/25  Time IMM was signed: 0939      Pt discharged home

## 2025-05-20 ENCOUNTER — TELEPHONE (OUTPATIENT)
Dept: GASTROENTEROLOGY | Facility: CLINIC | Age: 65
End: 2025-05-20
Payer: MEDICARE

## 2025-05-20 NOTE — TELEPHONE ENCOUNTER
Attempted to call results. Left message. Letter sent            ----- Message from Ashkan Holden MD sent at 5/19/2025 10:46 AM CDT -----  EGD biopsy showed normal duodenal tissue.  Recommendation:  Scheduled colonoscopy for iron-deficiency anemia.  The patient left the hospital AMA and did not stay for an inpatient colonoscopy.  ----- Message -----  From: Lab, Background User  Sent: 5/19/2025   8:32 AM CDT  To: Ashkan Holden MD

## 2025-05-21 NOTE — DISCHARGE SUMMARY
[facility] Hospital Medicine  Discharge Summary      Patient Name: Heather Rockwell  MRN: [id number]  RACHELL: [id number]  Patient Class: IP- Inpatient  Admission Date: 5/15/2025  Hospital Length of Stay: 2 days  Discharge Date and Time: 5/17/2025  5:26 PM  Attending Physician: Karlie att. providers found   Discharging Provider: Steff Corado DO  Primary Care Provider: Karlie, Primary Doctor    Primary Care Team: Networked reference to record PCT     HPI:   65 year old female presents with abdominal pain, nausea, and vomiting since last night.  She was also having loose BM's.  In the ER, CT scan shows ileus/SBO.      * No surgery found *      Hospital Course:   65 year old female presents with abdominal pain, nausea, and vomiting due to SBO/ileus and possible biliary obstruction. GI evaluation in process with recommendations for inpatient colonoscopy however patient discharged AMA before this was done. Counseled regarding risks of leaving before workup completed. She expressed understanding but was insistent upon discharge.      Goals of Care Treatment Preferences:  Code Status: Full Code      SDOH Screening:  The patient was screened for food insecurity, housing instability, transportation needs, utility difficulties, and interpersonal safety. The social determinant(s) of health identified as a concern this admission are:  Housing instability  Food insecurity  Transportation difficulties    Will not discuss with case management and/or community health workers.    Social Drivers of Health with Concerns     Food Insecurity: Food Insecurity Present (5/16/2025)   Housing Stability: High Risk (5/16/2025)   Transportation Needs: Unmet Transportation Needs (5/16/2025)        Consults:   Consults (From admission, onward)          Status Ordering Provider     Inpatient consult to Gastroenterology  Once        Provider:  Ashkan Holden MD    Completed JAELYN BONNER     Inpatient  "consult to General Surgery  Once        Provider:  Esvin Rodriguez, DO    Completed JAELYN BONNER            Assessment & Plan  Small bowel obstruction  Appreciate consult from Surgery; on CLD; CT scan showed possible ileus/SBO; she also has possible colitis; on Vancomycin, Rocephin, and Flagyl; negative C diff and Enteric pathogen panel; CT showed possible malignancy; normal CEA; appreciate consult from GI; s/p EGD that did not show acute etiology; normal hepatitis panel; on IVF    Common bile duct dilation  Will leave to GI    Ileus  Consult Surgery; will place on CLD; CT scan showed possible ileus/SBO; will consult Surgery; she also has possible colitis; will start Vancomycin, Rocephin, and Flagyl; will check C diff and Enteric pathogen panel; CT showed possible malignancy; will check CEA; will consult GI; will start IVF; check occult blood; check hepatitis panel    Nausea      Diarrhea      Chronic anemia  Anemia is likely due to undetermined. Most recent hemoglobin and hematocrit are listed below.  No results for input(s): "HGB", "HCT" in the last 72 hours.    Plan  - Monitor serial CBC: Daily  - Transfuse PRBC if patient becomes hemodynamically unstable, symptomatic or H/H drops below 7/21.  - Patient has not received any PRBC transfusions to date  - Patient's anemia is currently stable  -  Abnormal CT of the abdomen      Final Active Diagnoses:    Diagnosis Date Noted POA    PRINCIPAL PROBLEM:  Small bowel obstruction [K56.609] 05/16/2025 Yes    Iron deficiency anemia [D50.9] 05/17/2025 Yes    Common bile duct dilation [K83.8] 05/16/2025 Yes    Nausea [R11.0] 05/16/2025 Yes    Diarrhea [R19.7] 05/16/2025 Yes    Chronic anemia [D64.9] 05/16/2025 Yes    Abnormal CT of the abdomen [R93.5] 05/16/2025 Yes    Ileus [K56.7] 05/15/2025 Yes      Problems Resolved During this Admission:       Discharged Condition: against medical advice    Disposition: Left Against Medical Adv*    Follow Up:    Patient " Instructions:   No discharge procedures on file.    Significant Diagnostic Studies: N/A    Pending Diagnostic Studies:       None           Medications:  None    Indwelling Lines/Drains at time of discharge:   Lines/Drains/Airways       None                   Time spent on the discharge of patient: 30 minutes         Steff Corado DO  Department of Hospital Medicine  [facility]

## 2025-05-21 NOTE — ASSESSMENT & PLAN NOTE
"Anemia is likely due to undetermined. Most recent hemoglobin and hematocrit are listed below.  No results for input(s): "HGB", "HCT" in the last 72 hours.    Plan  - Monitor serial CBC: Daily  - Transfuse PRBC if patient becomes hemodynamically unstable, symptomatic or H/H drops below 7/21.  - Patient has not received any PRBC transfusions to date  - Patient's anemia is currently stable  -  "

## 2025-07-28 ENCOUNTER — HOSPITAL ENCOUNTER (EMERGENCY)
Facility: HOSPITAL | Age: 65
Discharge: HOME | End: 2025-07-28
Payer: MEDICARE

## 2025-07-28 VITALS
OXYGEN SATURATION: 99 % | TEMPERATURE: 98 F | WEIGHT: 188 LBS | HEIGHT: 66 IN | DIASTOLIC BLOOD PRESSURE: 74 MMHG | HEART RATE: 80 BPM | RESPIRATION RATE: 18 BRPM | BODY MASS INDEX: 30.22 KG/M2 | SYSTOLIC BLOOD PRESSURE: 150 MMHG

## 2025-07-28 DIAGNOSIS — H00.014 HORDEOLUM EXTERNUM OF LEFT UPPER EYELID: Primary | ICD-10-CM

## 2025-07-28 PROCEDURE — 25000003 PHARM REV CODE 250

## 2025-07-28 PROCEDURE — 99284 EMERGENCY DEPT VISIT MOD MDM: CPT

## 2025-07-28 RX ORDER — ERYTHROMYCIN 5 MG/G
OINTMENT OPHTHALMIC
Status: COMPLETED | OUTPATIENT
Start: 2025-07-28 | End: 2025-07-28

## 2025-07-28 RX ORDER — NAPHAZOLINE HCL/GLYCERIN 0.03%-0.5%
2 DROPS OPHTHALMIC (EYE) EVERY 6 HOURS PRN
Qty: 5 ML | Refills: 0 | Status: SHIPPED | OUTPATIENT
Start: 2025-07-28 | End: 2025-08-07

## 2025-07-28 RX ORDER — ERYTHROMYCIN 5 MG/G
OINTMENT OPHTHALMIC EVERY 6 HOURS
Qty: 10 G | Refills: 0 | Status: SHIPPED | OUTPATIENT
Start: 2025-07-28 | End: 2025-08-07

## 2025-07-28 RX ADMIN — ERYTHROMYCIN: 5 OINTMENT OPHTHALMIC at 09:07
